# Patient Record
Sex: MALE | Race: WHITE | Employment: OTHER | URBAN - METROPOLITAN AREA
[De-identification: names, ages, dates, MRNs, and addresses within clinical notes are randomized per-mention and may not be internally consistent; named-entity substitution may affect disease eponyms.]

---

## 2017-01-27 ENCOUNTER — OFFICE VISIT (OUTPATIENT)
Dept: OPHTHALMOLOGY | Facility: CLINIC | Age: 62
End: 2017-01-27
Attending: OPHTHALMOLOGY
Payer: COMMERCIAL

## 2017-01-27 DIAGNOSIS — H35.371 ERM OD (EPIRETINAL MEMBRANE, RIGHT EYE): ICD-10-CM

## 2017-01-27 DIAGNOSIS — Z86.69 HISTORY OF RETINAL DETACHMENT: ICD-10-CM

## 2017-01-27 PROCEDURE — 99212 OFFICE O/P EST SF 10 MIN: CPT | Mod: 25,ZF

## 2017-01-27 PROCEDURE — 92134 CPTRZ OPH DX IMG PST SGM RTA: CPT | Mod: ZF | Performed by: OPHTHALMOLOGY

## 2017-01-27 RX ORDER — PREDNISOLONE ACETATE 10 MG/ML
1 SUSPENSION/ DROPS OPHTHALMIC 3 TIMES DAILY
Qty: 1 BOTTLE | Refills: 0 | Status: SHIPPED | OUTPATIENT
Start: 2017-01-27 | End: 2017-01-27

## 2017-01-27 RX ORDER — PREDNISOLONE ACETATE 10 MG/ML
1 SUSPENSION/ DROPS OPHTHALMIC 2 TIMES DAILY
Qty: 1 BOTTLE | Refills: 0 | Status: SHIPPED | OUTPATIENT
Start: 2017-01-27 | End: 2017-02-22

## 2017-01-27 ASSESSMENT — VISUAL ACUITY
METHOD: SNELLEN - LINEAR
OD_CC: 20/400
OS_PH_CC: 20/250
CORRECTION_TYPE: GLASSES
OS_CC: 20/400

## 2017-01-27 ASSESSMENT — TONOMETRY
OS_IOP_MMHG: 15
IOP_METHOD: TONOPEN
OD_IOP_MMHG: 22

## 2017-01-27 ASSESSMENT — REFRACTION_WEARINGRX
OS_SPHERE: +0.25
OS_AXIS: 175
OD_SPHERE: PLANO
OS_CYLINDER: +1.25

## 2017-01-27 ASSESSMENT — CUP TO DISC RATIO
OD_RATIO: 0.2
OS_RATIO: 0.1

## 2017-01-27 ASSESSMENT — SLIT LAMP EXAM - LIDS
COMMENTS: NORMAL
COMMENTS: NORMAL

## 2017-01-27 ASSESSMENT — EXTERNAL EXAM - RIGHT EYE: OD_EXAM: NORMAL

## 2017-01-27 ASSESSMENT — EXTERNAL EXAM - LEFT EYE: OS_EXAM: NORMAL

## 2017-01-27 NOTE — MR AVS SNAPSHOT
After Visit Summary   1/27/2017    Lex Oates    MRN: [id number]           Patient Information     Date Of Birth          1955        Visit Information        Provider Department      1/27/2017 8:30 AM Yolette Perez MD Eye Clinic        Today's Diagnoses     ERM OD (epiretinal membrane, right eye)         History of retinal detachment            Follow-ups after your visit        Follow-up notes from your care team     Return in about 2 months (around 3/27/2017) for OCT OU.      Your next 10 appointments already scheduled     Mar 27, 2017  9:00 AM   RETURN RETINA with Yolette Perez MD   Eye Clinic ([facility])    [address]  [address] [place] [id number] [phone]            Jun 27, 2017  9:00 AM   RETURN CORNEA with Sj Petersen MD   Eye Clinic ([facility])    [address]  [address] [facility]  [address] Clin 9a  [facility] [address]   [phone]              Future tests that were ordered for you today     Open Future Orders        Priority Expected Expires Ordered    OCT Retina Spectralis OU (both eyes) Routine  7/31/2018 1/27/2017            Who to contact     Please call your clinic at [phone] to:    Ask questions about your health    Make or cancel appointments    Discuss your medicines    Learn about your test results    Speak to your doctor   If you have compliments or concerns about an experience at your clinic, or if you wish to file a complaint, please contact [facility] Patient Relations at [phone] or email us at [email or web address]         Additional Information About Your Visit        MyChart Information     Sliced Apples is an electronic gateway that provides easy, online access to your medical records. With Sliced Apples, you can request a clinic appointment, read your test results, renew a prescription or communicate with your care team.      To sign up for RV ID visit the website at [email or web address]   You will be asked to enter the access code listed below, as well as some personal information. Please follow the directions to create your username and password.     Your access code is: [email or web address]  Expires: 2017  9:00 AM     Your access code will  in 90 days. If you need help or a new code, please contact your [facility] or call [phone] for assistance.        Care EveryWhere ID     This is your Care EveryWhere ID. This could be used by other organizations to access your [place] medical records  [id number]         Blood Pressure from Last 3 Encounters:   10/30/15 137/72   01/26/15 152/78   14 164/82    Weight from Last 3 Encounters:   10/30/15 103.7 kg (228 lb 9.9 oz)   01/22/15 111.676 kg (246 lb 3.2 oz)   14 114 kg (251 lb 5.2 oz)              We Performed the Following     OCT Retina Spectralis OU (both eyes)          Today's Medication Changes          These changes are accurate as of: 17  9:44 AM.  If you have any questions, ask your nurse or doctor.               These medicines have changed or have updated prescriptions.        Dose/Directions    * PRED FORTE 1 % ophthalmic susp   This may have changed:  Another medication with the same name was added. Make sure you understand how and when to take each.   Used for:  Post-operative state   Generic drug:  prednisoLONE acetate   Changed by:  Sj Petersen MD        Dose:  1 drop   Place 1 drop into the right eye 2 times daily   Quantity:  1 Bottle   Refills:  11       * prednisoLONE acetate 1 % ophthalmic susp   Commonly known as:  PRED FORTE   This may have changed:  You were already taking a medication with the same name, and this prescription was added. Make sure you understand how and when to take each.   Used for:  ERM OD (epiretinal membrane, right eye), History of retinal detachment   Changed by:   Yolette Perez MD        Dose:  1 drop   Place 1 drop into the right eye 2 times daily   Quantity:  1 Bottle   Refills:  0       * Notice:  This list has 2 medication(s) that are the same as other medications prescribed for you. Read the directions carefully, and ask your doctor or other care provider to review them with you.         Where to get your medicines      These medications were sent to [facility]/pharmacy #[id number] - [place] - [address]  [address] [address]     Phone:  [phone]    - prednisoLONE acetate 1 % ophthalmic susp             Primary Care Provider Office Phone # Fax #    Torin Robles [phone]       [facility] [address] [place] [address]        Thank you!     Thank you for choosing EYE CLINIC  for your care. Our goal is always to provide you with excellent care. Hearing back from our patients is one way we can continue to improve our services. Please take a few minutes to complete the written survey that you may receive in the mail after your visit with us. Thank you!             Your Updated Medication List - Protect others around you: Learn how to safely use, store and throw away your medicines at www.disposemymeds.org.          This list is accurate as of: 1/27/17  9:44 AM.  Always use your most recent med list.                   Brand Name Dispense Instructions for use    ASPIRIN PO      Take 81 mg by mouth daily       ATIVAN PO      Take 0.5 mg by mouth 3 times daily as needed for anxiety       glyBURIDE 1.25 MG tablet    DIABETA /MICRONASE     Take 2 tablets by mouth 2 times daily       HYDROcodone-acetaminophen 5-325 MG per tablet    NORCO    20 tablet    Take 1 tablet by mouth every 6 hours as needed for pain Maximum of 4000 mg of acetaminophen in 24 hours.       LANTUS VIAL 100 UNIT/ML injection   Generic drug:  insulin glargine      Inject 70 Units Subcutaneous At Bedtime       latanoprost 0.005 % ophthalmic solution     XALATAN    3 Bottle    Place 1 drop Into the left eye At Bedtime       LIPITOR 10 MG tablet   Generic drug:  atorvastatin      Take 10 mg by mouth daily       LOSARTAN POTASSIUM PO      Take 100 mg by mouth daily       NORVASC 2.5 MG tablet   Generic drug:  amLODIPine      Take 1 tablet by mouth daily       * ofloxacin 0.3 % ophthalmic solution    OCUFLOX    1 Bottle    Place 1 drop into the right eye 4 times daily       * ofloxacin 0.3 % ophthalmic solution    OCUFLOX    1 Bottle    Place 1 drop into the right eye 4 times daily       * PRED FORTE 1 % ophthalmic susp   Generic drug:  prednisoLONE acetate     1 Bottle    Place 1 drop into the right eye 2 times daily       * prednisoLONE acetate 1 % ophthalmic susp    PRED FORTE    1 Bottle    Place 1 drop into the right eye 2 times daily       PRILOSEC OTC PO      Take 20 mg by mouth daily       REFRESH 1.4-0.6 % ophthalmic solution   Generic drug:  polyvinyl alcohol-povidone      Place 1-2 drops into the right eye as needed       triamcinolone 0.1 % cream    KENALOG     2 times daily       * Notice:  This list has 4 medication(s) that are the same as other medications prescribed for you. Read the directions carefully, and ask your doctor or other care provider to review them with you.

## 2017-01-27 NOTE — PROGRESS NOTES
CC - Post Op OD    HPI:  VA OD improving since SHANIQUA  60 yo male POM #1 s/p PPV/MP/AC washout/FAX OD for ERM 12/22/16.      Mr. Oates is a 61  year old male s/p silicone oil removal right eye 1/25/2015, prior  PPV/SO replacement for RRD (5/1/14).  Retinal detachment right eye s/p repair x 4, most recent Pars plana vitrectomy (PPV)/SO 5/1/14    Ocular surgery:  PPV/MS/FAx/AC washout OD 12/22/16 for SO in AC and severe ERM  pentrating keratoplasty (PKP) right eye with intraocular lens exchange 6/17/15  Pars plana vitrectomy (PPV)/SO removal /FAx 1/25/2015 right eye   Pars plana vitrectomy (PPV)/SO placement 5/1/2014 right eye   Pars plana vitrectomy (PPV)/SO removal/FGx C3F8 2/25/14 (DDK) right eye   PPV/SO 9/19/13 (DDK) right eye   S/p 7/15, 8/16 Pars plana vitrectomy (PPV) and SBP with FGx (Max Alcon) OD  S/p pentrating keratoplasty (PKP) 3/26/13 (noé)  S/p laser in situ keratomileusis (LASIK) both eyes 1994  S/p Radial keratotomy (RK) both eyes 1994  S/p Cataract extraction intraocular lens both eyes 2003, 2004  Sp Retinal detachment  Repair left eye 1994    Ocular meds:  Latanoprost 1 drop nightly left eye.  PFATs as needed     PF OD 4/day  PT OD 4/day    RETINAL IMAGING   OCT  11-15-16  Right eye- Moderate/severe cystoid macular edema/distortion, stable; 747->723  Left eye- 2-3+ cystoid macular edema, retina flat. Poor quality image. Appears stable; 706->716.    ASSESSMENT & PLAN  1.  POM#1 s/p PPV/MP/AC washout/FAX OD   - IOP OK (mild elevation), retina flat      - PF 3/day x 1 week then 2/day baseline d/t PK   - stop PT   - add xalatan OD    - RTC 2 months      2.  H/o RD OD (multiple)   - S/p Pars plana vitrectomy (PPV)/SO removal/FAx/STS  right eye 1/25/15   - S/P prior Pars plana vitrectomy (PPV)/SO placement 5/1/14 right eye   - Retina flat. Breaks nasal, superior, and inferotemporal with pexy   - Intraocular pressure OK, retina flat    3.  cystoid macular edema left eye   - letha chronic   - unclear  benefit from Tx   - could try trial of IVTA in future    4.  pentrating keratoplasty (PKP) right eye.    - s/p pentrating keratoplasty (PKP)/IOL exchange 6/2015   - follows with Page, doing well     5.   s/p Retinal detachment repair left eye, 1994   -no breaks/tears   -observe    6.  ocular hypertension both eyes   - Pressures stable. Continue Latanoprost  1 drop nightly OS   - add to OD d/t IOP elevation   - Continue to monitor.     7.  Pseudophakia - both eyes    In good position.    8.  DM no DR   - has had extensive laser for RD OU      RTC 2 months, OCT OU    ATTESTATION     Attending Physician Attestation:     Complete documentation of historical and exam elements from today's encounter can be found in the full encounter summary report (not redupilcated in this progress note).  I personally obtained the chief complaint(s) and hisotry of present illness., I have confirmed and edited as necessary the CC, HPI, PMH/PSH, Social history, FMH,  ROS, and exam/neuro findings as obtained by the technician or others. I have examined this patient myself.  and I personally viewed the image(s) and studies listed above and the documentation reflects my findings and interpretation.    Yolette Perez MD, PhD  , Vitreoretinal Surgery  Department of Ophthalmology  [facility]

## 2017-01-27 NOTE — NURSING NOTE
Chief Complaints and History of Present Illnesses   Patient presents with     Follow Up For     1 month follow up s/p PPV/MP/AC washout/FAX OD for ERM     HPI    Affected eye(s):  Both   Symptoms:     No floaters   No redness   No Dryness         Do you have eye pain now?:  No      Comments:  Pt states vision has improved since last visit. Pt fell down on 12/26/2016 and was seeing flashes in RE. Pt still seeing flashes in RE when he lays down for bed.  DMII BS: 146 this morning.  A1C: 7.2 taken 12/2016 per pt  No results found for this basename: a1c    [facility] January 27, 2017 8:33 AM

## 2017-02-22 DIAGNOSIS — H35.371 ERM OD (EPIRETINAL MEMBRANE, RIGHT EYE): ICD-10-CM

## 2017-02-22 DIAGNOSIS — Z86.69 HISTORY OF RETINAL DETACHMENT: ICD-10-CM

## 2017-02-22 RX ORDER — PREDNISOLONE ACETATE 10 MG/ML
1 SUSPENSION/ DROPS OPHTHALMIC 2 TIMES DAILY
Qty: 5 ML | Refills: 1 | Status: SHIPPED | OUTPATIENT
Start: 2017-02-22 | End: 2017-07-11

## 2017-02-22 NOTE — TELEPHONE ENCOUNTER
Prednisolone AC 1%  Last Written Prescription Date:  1/27/17  Last Fill Quantity: 1,   # refills: 0  Last Office Visit: 1/27/17  Future Office visit:   3/27/17  Last Note:Progress Notes  Encounter Date: 1/27/2017  Yolette Perez MD   Ophthalmology      CC - Post Op OD     HPI:  VA OD improving since SHANIQUA  62 yo male POM #1 s/p PPV/MP/AC washout/FAX OD for ERM 12/22/16.      Mr. Oates is a 61 year old male s/p silicone oil removal right eye 1/25/2015, prior PPV/SO replacement for RRD (5/1/14).  Retinal detachment right eye s/p repair x 4, most recent Pars plana vitrectomy (PPV)/SO 5/1/14     Ocular surgery:  PPV/MS/FAx/AC washout OD 12/22/16 for SO in AC and severe ERM  pentrating keratoplasty (PKP) right eye with intraocular lens exchange 6/17/15  Pars plana vitrectomy (PPV)/SO removal /FAx 1/25/2015 right eye   Pars plana vitrectomy (PPV)/SO placement 5/1/2014 right eye   Pars plana vitrectomy (PPV)/SO removal/FGx C3F8 2/25/14 (DDK) right eye   PPV/SO 9/19/13 (DDK) right eye   S/p 7/15, 8/16 Pars plana vitrectomy (PPV) and SBP with FGx (Max Alcon) OD  S/p pentrating keratoplasty (PKP) 3/26/13 (noé)  S/p laser in situ keratomileusis (LASIK) both eyes 1994  S/p Radial keratotomy (RK) both eyes 1994  S/p Cataract extraction intraocular lens both eyes 2003, 2004  Sp Retinal detachment Repair left eye 1994     Ocular meds:  Latanoprost 1 drop nightly left eye.  PFATs as needed      PF OD 4/day  PT OD 4/day     RETINAL IMAGING   OCT 11-15-16  Right eye- Moderate/severe cystoid macular edema/distortion, stable; 747->723  Left eye- 2-3+ cystoid macular edema, retina flat. Poor quality image. Appears stable; 706->716.     ASSESSMENT & PLAN  1. POM#1 s/p PPV/MP/AC washout/FAX OD  - IOP OK (mild elevation), retina flat      - PF 3/day x 1 week then 2/day baseline d/t PK  - stop PT  - add xalatan OD   - RTC 2 months        2. H/o RD OD (multiple)  - S/p Pars plana vitrectomy (PPV)/SO removal/FAx/STS right eye  1/25/15  - S/P prior Pars plana vitrectomy (PPV)/SO placement 5/1/14 right eye  - Retina flat. Breaks nasal, superior, and inferotemporal with pexy  - Intraocular pressure OK, retina flat     3. cystoid macular edema left eye  - likley chronic  - unclear benefit from Tx  - could try trial of IVTA in future     4. pentrating keratoplasty (PKP) right eye.   - s/p pentrating keratoplasty (PKP)/IOL exchange 6/2015  - follows with Page, doing well      5. s/p Retinal detachment repair left eye, 1994  -no breaks/tears  -observe     6. ocular hypertension both eyes  - Pressures stable. Continue Latanoprost 1 drop nightly OS  - add to OD d/t IOP elevation  - Continue to monitor.      7. Pseudophakia - both eyes   In good position.     8. DM no DR  - has had extensive laser for RD OU        RTC 2 months, OCT OU     ATTESTATION      Attending Physician Attestation:      Complete documentation of historical and exam elements from today's encounter can be found in the full encounter summary report (not redupilcated in this progress note). I personally obtained the chief complaint(s) and hisotry of present illness., I have confirmed and edited as necessary the CC, HPI, PMH/PSH, Social history, FMH, ROS, and exam/neuro findings as obtained by the technician or others. I have examined this patient myself. and I personally viewed the image(s) and studies listed above and the documentation reflects my findings and interpretation.     Yolette Perez MD, PhD  , Vitreoretinal Surgery  Department of Ophthalmology  [facility]               Routing refill request to provider for review/approval because:  On med list twice with 2 different eye providers. Unsure if med is to be continued and who is ordering

## 2017-03-27 ENCOUNTER — OFFICE VISIT (OUTPATIENT)
Dept: OPHTHALMOLOGY | Facility: CLINIC | Age: 62
End: 2017-03-27
Attending: OPHTHALMOLOGY
Payer: COMMERCIAL

## 2017-03-27 DIAGNOSIS — H35.353 CYSTOID MACULAR DEGENERATION OF RETINA, BILATERAL: ICD-10-CM

## 2017-03-27 PROCEDURE — 99213 OFFICE O/P EST LOW 20 MIN: CPT | Mod: ZF

## 2017-03-27 PROCEDURE — 92134 CPTRZ OPH DX IMG PST SGM RTA: CPT | Mod: ZF | Performed by: OPHTHALMOLOGY

## 2017-03-27 ASSESSMENT — TONOMETRY
OS_IOP_MMHG: 14
OD_IOP_MMHG: 18
IOP_METHOD: TONOPEN

## 2017-03-27 ASSESSMENT — VISUAL ACUITY
CORRECTION_TYPE: GLASSES
OD_CC: 20/400
OS_CC: 20/150
METHOD: SNELLEN - LINEAR

## 2017-03-27 ASSESSMENT — EXTERNAL EXAM - LEFT EYE: OS_EXAM: NORMAL

## 2017-03-27 ASSESSMENT — CUP TO DISC RATIO
OD_RATIO: 0.2
OS_RATIO: 0.1

## 2017-03-27 ASSESSMENT — SLIT LAMP EXAM - LIDS
COMMENTS: NORMAL
COMMENTS: NORMAL

## 2017-03-27 ASSESSMENT — EXTERNAL EXAM - RIGHT EYE: OD_EXAM: NORMAL

## 2017-03-27 NOTE — MR AVS SNAPSHOT
After Visit Summary   3/27/2017    Lex Oates    MRN: [id number]           Patient Information     Date Of Birth          1955        Visit Information        Provider Department      3/27/2017 9:00 AM Yloette Perez MD Eye Clinic        Today's Diagnoses     Cystoid macular degeneration of retina, bilateral           Follow-ups after your visit        Follow-up notes from your care team     Return in 3 months (on 6/27/2017) for OCT OU.      Your next 10 appointments already scheduled     Jun 27, 2017  9:00 AM CDT   RETURN CORNEA with Sj Petersen MD   Eye Clinic ([facility])    [address]  [address]   [address] Clin 9a  [facility] [address]   [phone]            Jul 11, 2017  8:30 AM CDT   RETURN RETINA with Yolette Perez MD   Eye Clinic ([facility])    [address]  [address]   [address] 9a  [facility] [address]   [phone]              Future tests that were ordered for you today     Open Future Orders        Priority Expected Expires Ordered    OCT Retina Spectralis OU (both eyes) Routine  9/28/2018 3/27/2017            Who to contact     Please call your clinic at [phone] to:    Ask questions about your health    Make or cancel appointments    Discuss your medicines    Learn about your test results    Speak to your doctor   If you have compliments or concerns about an experience at your clinic, or if you wish to file a complaint, please contact [facility] Patient Relations at [phone] or email us at [email or web address]         Additional Information About Your Visit        Adapt Technologieshart Information     Presto Services is an electronic gateway that provides easy, online access to your medical records. With Presto Services, you can request a clinic appointment, read your test results, renew a prescription or communicate with your care team.     To sign up for Adapt Technologieshart visit  the website at [email or web address]   You will be asked to enter the access code listed below, as well as some personal information. Please follow the directions to create your username and password.     Your access code is: [email or web address]  Expires: 2017  8:30 AM     Your access code will  in 90 days. If you need help or a new code, please contact your [facility] or call [phone] for assistance.        Care EveryWhere ID     This is your Care EveryWhere ID. This could be used by other organizations to access your [place] medical records  [id number]         Blood Pressure from Last 3 Encounters:   10/30/15 137/72   01/26/15 152/78   14 164/82    Weight from Last 3 Encounters:   10/30/15 103.7 kg (228 lb 9.9 oz)   01/22/15 111.7 kg (246 lb 3.2 oz)   14 114 kg (251 lb 5.2 oz)              We Performed the Following     OCT Retina Spectralis OU (both eyes)        Primary Care Provider Office Phone # Fax #    Torin Robles [phone]       [facility] [address] [place] [address]        Thank you!     Thank you for choosing EYE CLINIC  for your care. Our goal is always to provide you with excellent care. Hearing back from our patients is one way we can continue to improve our services. Please take a few minutes to complete the written survey that you may receive in the mail after your visit with us. Thank you!             Your Updated Medication List - Protect others around you: Learn how to safely use, store and throw away your medicines at www.disposemymeds.org.          This list is accurate as of: 3/27/17 11:14 AM.  Always use your most recent med list.                   Brand Name Dispense Instructions for use    ASPIRIN PO      Take 81 mg by mouth daily       ATIVAN PO      Take 0.5 mg by mouth 3 times daily as needed for anxiety       glyBURIDE 1.25 MG tablet    DIABETA /MICRONASE     Take 2 tablets by mouth 2 times daily        HYDROcodone-acetaminophen 5-325 MG per tablet    NORCO    20 tablet    Take 1 tablet by mouth every 6 hours as needed for pain Maximum of 4000 mg of acetaminophen in 24 hours.       LANTUS VIAL 100 UNIT/ML injection   Generic drug:  insulin glargine      Inject 70 Units Subcutaneous At Bedtime       latanoprost 0.005 % ophthalmic solution    XALATAN    3 Bottle    Place 1 drop Into the left eye At Bedtime       LIPITOR 10 MG tablet   Generic drug:  atorvastatin      Take 10 mg by mouth daily       LOSARTAN POTASSIUM PO      Take 100 mg by mouth daily       NORVASC 2.5 MG tablet   Generic drug:  amLODIPine      Take 1 tablet by mouth daily       * ofloxacin 0.3 % ophthalmic solution    OCUFLOX    1 Bottle    Place 1 drop into the right eye 4 times daily       * ofloxacin 0.3 % ophthalmic solution    OCUFLOX    1 Bottle    Place 1 drop into the right eye 4 times daily       * PRED FORTE 1 % ophthalmic susp   Generic drug:  prednisoLONE acetate     1 Bottle    Place 1 drop into the right eye 2 times daily       * prednisoLONE acetate 1 % ophthalmic susp    PRED FORTE    5 mL    Place 1 drop into the right eye 2 times daily       PRILOSEC OTC PO      Take 20 mg by mouth daily       REFRESH 1.4-0.6 % ophthalmic solution   Generic drug:  polyvinyl alcohol-povidone      Place 1-2 drops into the right eye as needed       triamcinolone 0.1 % cream    KENALOG     2 times daily       * Notice:  This list has 4 medication(s) that are the same as other medications prescribed for you. Read the directions carefully, and ask your doctor or other care provider to review them with you.

## 2017-03-27 NOTE — NURSING NOTE
Chief Complaints and History of Present Illnesses   Patient presents with     Follow Up For     s/p PPV/MP/AC washout/FAX OD     HPI    Affected eye(s):  Right   Symptoms:     No blurred vision   No decreased vision   Dryness (Comment: from windy weather)         Do you have eye pain now?:  No      Comments:  No changes to report per pt.  Mirela Merino COA 9:18 AM March 27, 2017

## 2017-03-27 NOTE — PROGRESS NOTES
CC - Post Op OD    INTERVAL HISTORY -  VA OD improving since SHANIQUA    HPI:    60 yo male POM #3 s/p PPV/MP/AC washout/FAX OD for ERM 12/22/16   Mr. Oates is a 61  year old male s/p silicone oil removal right eye 1/25/2015, prior  PPV/SO replacement for RRD (5/1/14).  Retinal detachment right eye s/p repair x 4, most recent Pars plana vitrectomy (PPV)/SO 5/1/14    Ocular surgery:  PPV/MS/FAx/AC washout OD 12/22/16 for SO in AC and severe ERM  pentrating keratoplasty (PKP) right eye with intraocular lens exchange 6/17/15  Pars plana vitrectomy (PPV)/SO removal /FAx 1/25/2015 right eye   Pars plana vitrectomy (PPV)/SO placement 5/1/2014 right eye   Pars plana vitrectomy (PPV)/SO removal/FGx C3F8 2/25/14 (DDK) right eye   PPV/SO 9/19/13 (DDK) right eye   S/p 7/15, 8/16 Pars plana vitrectomy (PPV) and SBP with FGx (Max Alcon) OD  S/p pentrating keratoplasty (PKP) 3/26/13 (noé)  S/p laser in situ keratomileusis (LASIK) both eyes 1994  S/p Radial keratotomy (RK) both eyes 1994  S/p Cataract extraction intraocular lens both eyes 2003, 2004  Sp Retinal detachment  Repair left eye 1994    Ocular meds:  Latanoprost 1 drop nightly both eyes  PFATs as needed     PF OD 2/day    RETINAL IMAGING   OCT  3-27-17  Right eye- minimal edema, stable, minimal distortion  Left eye- 2-3+ cystoid macular edema, retina flat. Poor quality image. Appears stable    ASSESSMENT & PLAN  1.  POM#3  OS   - s/p PPV/MP/AC washout/FAX OD 12/22/16   - IOP good, retina flat      - Continue PF 2x daily (for PKP)   - continue xalatan OD    - RTC 3 months     2.  H/o RD OD (multiple)   - S/p Pars plana vitrectomy (PPV)/SO removal/FAx/STS  right eye 1/25/15   - S/P prior Pars plana vitrectomy (PPV)/SO placement 5/1/14 right eye   - Retina flat. Breaks nasal, superior, and inferotemporal with pexy   - Intraocular pressure OK, retina flat    3.  Cystoid macular edema left eye   - likley chronic - has improved some since last visit   - trial of PF 2x  daily   - re-check in 2 months with OCT    4.  pentrating keratoplasty (PKP) right eye.    - s/p pentrating keratoplasty (PKP)/IOL exchange 6/2015   - follows with [facility], doing well     5.   s/p Retinal detachment repair left eye, 1994   -no breaks/tears   -observe    6.  ocular hypertension both eyes   - Pressures stable. Continue Latanoprost  1 drop nightly OS   - add to OD d/t IOP elevation   - Continue to monitor.     7.  Pseudophakia - both eyes    In good position.    8.  DM no DR   - has had extensive laser for RD OU    RTC 2-3 months, OCT OU    Brenden Wadsworth MD  Retina Fellow    ATTESTATION     Attending Physician Attestation:     Complete documentation of historical and exam elements from today's encounter can be found in the full encounter summary report (not redupilcated in this progress note).  I personally obtained the chief complaint(s) and hisotry of present illness., I have confirmed and edited as necessary the Past medical history/Past Surgical History, Social history, Family medical history,  Review of systems, and exam/neuro findings as obtained by the technician or others. I have examined this patient myself. , I personally viewed the relevant tests, image(s), reports, and studies listed above and the documentation reflects my findings and interpretation. and I formulated and edited as necessary the assessment and plan and discussed the findings and management plan with the patient and family.    Yolette Perez MD, PhD  , Vitreoretinal Surgery  Department of Ophthalmology  [facility]

## 2017-06-27 ENCOUNTER — OFFICE VISIT (OUTPATIENT)
Dept: OPHTHALMOLOGY | Facility: CLINIC | Age: 62
End: 2017-06-27
Attending: OPHTHALMOLOGY
Payer: COMMERCIAL

## 2017-06-27 DIAGNOSIS — Z96.1 PSEUDOPHAKIA OF BOTH EYES: Primary | ICD-10-CM

## 2017-06-27 DIAGNOSIS — Z94.7 HISTORY OF PENETRATING KERATOPLASTY: ICD-10-CM

## 2017-06-27 DIAGNOSIS — H43.312 VITREOUS STRANDS OF LEFT EYE: ICD-10-CM

## 2017-06-27 PROCEDURE — 99212 OFFICE O/P EST SF 10 MIN: CPT | Mod: ZF

## 2017-06-27 PROCEDURE — 67031 LASER SURGERY EYE STRANDS: CPT | Mod: ZF | Performed by: OPHTHALMOLOGY

## 2017-06-27 RX ORDER — MINERAL OIL, PETROLATUM 425; 573 MG/G; MG/G
1 OINTMENT OPHTHALMIC AT BEDTIME
COMMUNITY
End: 2018-09-25

## 2017-06-27 ASSESSMENT — SLIT LAMP EXAM - LIDS: COMMENTS: NORMAL

## 2017-06-27 ASSESSMENT — VISUAL ACUITY
OS_CC: 20/250
METHOD: SNELLEN - LINEAR
OD_CC: 20/250
CORRECTION_TYPE: GLASSES
OS_PH_CC: 20/125

## 2017-06-27 ASSESSMENT — REFRACTION_WEARINGRX
OS_CYLINDER: +1.25
OS_AXIS: 175
OS_SPHERE: +0.25
OD_SPHERE: PLANO

## 2017-06-27 ASSESSMENT — CONF VISUAL FIELD
OS_INFERIOR_NASAL_RESTRICTION: 3
OS_INFERIOR_TEMPORAL_RESTRICTION: 3
OS_SUPERIOR_NASAL_RESTRICTION: 3
OS_SUPERIOR_TEMPORAL_RESTRICTION: 3
OD_SUPERIOR_TEMPORAL_RESTRICTION: 3

## 2017-06-27 ASSESSMENT — TONOMETRY
OS_IOP_MMHG: 13
OD_IOP_MMHG: 12
IOP_METHOD: ICARE

## 2017-06-27 ASSESSMENT — EXTERNAL EXAM - RIGHT EYE: OD_EXAM: NORMAL

## 2017-06-27 ASSESSMENT — EXTERNAL EXAM - LEFT EYE: OS_EXAM: NORMAL

## 2017-06-27 NOTE — PROGRESS NOTES
CC - recheck for K transplant    INTERVAL HISTORY -  VA OD improving since SHANIQUA    HPI:    60 yo male POM #3 s/p PPV/MP/AC washout/FAX OD for ERM 12/22/16   Mr. Oates is a 61  year old male s/p silicone oil removal right eye 1/25/2015, prior  PPV/SO replacement for RRD (5/1/14).  Retinal detachment right eye s/p repair x 4, most recent Pars plana vitrectomy (PPV)/SO 5/1/14    Ocular surgery:  PPV/MS/FAx/AC washout OD 12/22/16 for SO in AC and severe ERM  pentrating keratoplasty (PKP) right eye with intraocular lens exchange 6/17/15  Pars plana vitrectomy (PPV)/SO removal /FAx 1/25/2015 right eye   Pars plana vitrectomy (PPV)/SO placement 5/1/2014 right eye   Pars plana vitrectomy (PPV)/SO removal/FGx C3F8 2/25/14 (DDK) right eye   PPV/SO 9/19/13 (DDK) right eye   S/p 7/15, 8/16 Pars plana vitrectomy (PPV) and SBP with FGx (Devicescape Alcon) OD  S/p pentrating keratoplasty (PKP) 3/26/13 (noé)  S/p laser in situ keratomileusis (LASIK) both eyes 1994  S/p Radial keratotomy (RK) both eyes 1994  S/p Cataract extraction intraocular lens both eyes 2003, 2004  Sp Retinal detachment  Repair left eye 1994    Ocular meds:  Latanoprost 1 drop nightly both eyes  PFATs as needed     PF OD 2/day    RETINAL IMAGING   OCT  3-27-17  Right eye- minimal edema, stable, minimal distortion  Left eye- 2-3+ cystoid macular edema, retina flat. Poor quality image. Appears stable    ASSESSMENT & PLAN  1.  POM#3  OD   - s/p PPV/MP/AC washout/FAX OD 12/22/16   - to see Kris in 2 weeks     2.  H/o RD OD (multiple)   - S/p Pars plana vitrectomy (PPV)/SO removal/FAx/STS  right eye 1/25/15   - S/P prior Pars plana vitrectomy (PPV)/SO placement 5/1/14 right eye   - Retina flat. Breaks nasal, superior, and inferotemporal with pexy   - Intraocular pressure OK, retina flat    3.  Cystoid macular edema left eye   - likeley chronic - has improved some since last visit   - vitreous peaking at pupil noted today    4.  pentrating keratoplasty (PKP) right  eye.    - s/p pentrating keratoplasty (PKP)/IOL exchange 6/2015   - follows with [facility], doing well     5.   s/p Retinal detachment repair left eye, 1994   -no breaks/tears   -observe    6.  ocular hypertension both eyes   - Pressures stable. Continue Latanoprost  1 drop nightly OS   - add to OD d/t IOP elevation   - Continue to monitor.     7.  Pseudophakia - both eyes    In good position.    8.  DM no DR   - has had extensive laser for RD OU        Small vitreous fiber noted left eye causing pupil peaking to old phacoemulsification wound -- not noted previously, may be a reason for his chronic cystoid macular edema left eye    Reasonable to do YAG vitreolysis today  Increase Predforte  To four times a day     Ok to try Rigid gas permeable lens in both eyes if patient interested -- Dr. NETO España ([place])      ~~~~~~~~~~~~~~~~~~~~~~~~~~~~~~~~~~~~~~~~~~~~~~~~~~~~~~~~~~~~~~~~      Complete documentation of historical and exam elements from today's encounter can be found in the full encounter summary report (not reduplicated in this progress note). I personally obtained the chief complaint(s) and history of present illness.  I confirmed and edited as necessary the review of systems, past medical/surgical history, family history, social history, and examination findings as documented by others.  I examined the patient myself, and I personally reviewed the relevant tests, images, and reports as documented above. I formulated and edited as necessary the assessment and plan and discussed the findings and management plan with the patient and family.     Sj Petersen MD, MA  Director, Cornea & Anterior Segment  [facility] Department of Ophthalmology & Visual Neuroscience

## 2017-06-27 NOTE — LETTER
6/27/2017       RE: Lex Oates  [address]  [place] [facility] [address]     Dear Colleague,    Thank you for referring your patient, Lex Oates, to the EYE CLINIC at [facility]. I am writing to give you an update on his status.  Please see a copy of my visit note below.    CC - recheck for K transplant    INTERVAL HISTORY -  VA OD improving since SHANIQUA    HPI:    60 yo male POM #3 s/p PPV/MP/AC washout/FAX OD for ERM 12/22/16   Mr. Oates is a 61  year old male s/p silicone oil removal right eye 1/25/2015, prior  PPV/SO replacement for RRD (5/1/14).  Retinal detachment right eye s/p repair x 4, most recent Pars plana vitrectomy (PPV)/SO 5/1/14    Ocular surgery:  PPV/MS/FAx/AC washout OD 12/22/16 for SO in AC and severe ERM  pentrating keratoplasty (PKP) right eye with intraocular lens exchange 6/17/15  Pars plana vitrectomy (PPV)/SO removal /FAx 1/25/2015 right eye   Pars plana vitrectomy (PPV)/SO placement 5/1/2014 right eye   Pars plana vitrectomy (PPV)/SO removal/FGx C3F8 2/25/14 (DDK) right eye   PPV/SO 9/19/13 (DDK) right eye   S/p 7/15, 8/16 Pars plana vitrectomy (PPV) and SBP with FGx (Max Alcon) OD  S/p pentrating keratoplasty (PKP) 3/26/13 (noé)  S/p laser in situ keratomileusis (LASIK) both eyes 1994  S/p Radial keratotomy (RK) both eyes 1994  S/p Cataract extraction intraocular lens both eyes 2003, 2004  Sp Retinal detachment  Repair left eye 1994    Ocular meds:  Latanoprost 1 drop nightly both eyes  PFATs as needed     PF OD 2/day    RETINAL IMAGING   OCT  3-27-17  Right eye- minimal edema, stable, minimal distortion  Left eye- 2-3+ cystoid macular edema, retina flat. Poor quality image. Appears stable    ASSESSMENT & PLAN  1.  POM#3  OD   - s/p PPV/MP/AC washout/FAX OD 12/22/16   - to see Kooze in 2 weeks     2.  H/o RD OD (multiple)   - S/p Pars plana vitrectomy (PPV)/SO removal/FAx/STS  right eye 1/25/15   - S/P prior Pars plana vitrectomy (PPV)/SO  placement 5/1/14 right eye   - Retina flat. Breaks nasal, superior, and inferotemporal with pexy   - Intraocular pressure OK, retina flat    3.  Cystoid macular edema left eye   - likeley chronic - has improved some since last visit   - vitreous peaking at pupil noted today    4.  pentrating keratoplasty (PKP) right eye.    - s/p pentrating keratoplasty (PKP)/IOL exchange 6/2015   - follows with [facility], doing well     5.   s/p Retinal detachment repair left eye, 1994   -no breaks/tears   -observe    6.  ocular hypertension both eyes   - Pressures stable. Continue Latanoprost  1 drop nightly OS   - add to OD d/t IOP elevation   - Continue to monitor.     7.  Pseudophakia - both eyes    In good position.    8.  DM no DR   - has had extensive laser for RD OU    RTC 2-3 months, OCT both eyes    Small vitreous fiber noted left eye causing pupil peaking to old phacoemulsification wound -- not noted previously, may be a reason for his chronic cystoid macular edema left eye    Reasonable to do YAG vitreolysis today  Increase Predforte  To four times a day     Ok to try Rigid gas permeable lens in both eyes if patient interested -- Dr. NETO España ([place])      ~~~~~~~~~~~~~~~~~~~~~~~~~~~~~~~~~~~~~~~~~~~~~~~~~~~~~~~~~~~~~~~~        Again, thank you for allowing me to participate in the care of your patient.      Sincerely,    Sj Petersen MD, MA  Director, Cornea & Anterior Segment  Director, Fellowship in Cornea & External Disease  [facility] Department of Ophthalmology & Visual Neuroscience  : Tosha Jimenez [phone]  Email: [email or web address]  Mobile: [phone]

## 2017-06-27 NOTE — NURSING NOTE
Chief Complaints and History of Present Illnesses   Patient presents with     Follow Up For     Pentrating keratoplasty (PKP) on 6/17/15 with intraocular lens exchange and glued IOL     HPI    Affected eye(s):  Both   Symptoms:        Duration:  6 months   Frequency:  Constant       Do you have eye pain now?:  No      Comments:  Pt. States that he is doing well.  No change in VA BE.  No c/o comfort BE.  Faith Garza COT 9:30 AM June 27, 2017

## 2017-06-27 NOTE — MR AVS SNAPSHOT
After Visit Summary   2017    Lex Oates    MRN: [id number]           Patient Information     Date Of Birth          1955        Visit Information        Provider Department      2017 9:00 AM Sj Petersen MD Eye Clinic        Today's Diagnoses     Pseudophakia of both eyes    -  1    History of penetrating keratoplasty        Vitreous strands of left eye           Follow-ups after your visit        Your next 10 appointments already scheduled     2017  8:30 AM CDT   RETURN RETINA with Yolette Perez MD   Eye Clinic ([facility])    [address]  [address]  [address]  [place] [address] [phone]              Who to contact     Please call your clinic at [phone] to:    Ask questions about your health    Make or cancel appointments    Discuss your medicines    Learn about your test results    Speak to your doctor   If you have compliments or concerns about an experience at your clinic, or if you wish to file a complaint, please contact [facility] Patient Relations at [phone] or email us at [email or web address]         Additional Information About Your Visit        MyChart Information     [employer] is an electronic gateway that provides easy, online access to your medical records. With [employer], you can request a clinic appointment, read your test results, renew a prescription or communicate with your care team.     To sign up for [employer] visit the website at [email or web address]   You will be asked to enter the access code listed below, as well as some personal information. Please follow the directions to create your username and password.     Your access code is: [email or web address]  Expires: 2017  6:30 AM     Your access code will  in 90 days. If you need help or a new code, please contact your [facility] Clinic or call [phone] for  assistance.        Care EveryWhere ID     This is your Care EveryWhere ID. This could be used by other organizations to access your [place] medical records  [id number]         Blood Pressure from Last 3 Encounters:   10/30/15 137/72   01/26/15 152/78   02/25/14 164/82    Weight from Last 3 Encounters:   10/30/15 103.7 kg (228 lb 9.9 oz)   01/22/15 111.7 kg (246 lb 3.2 oz)   02/25/14 114 kg (251 lb 5.2 oz)              We Performed the Following     Laser Vitreolysis, Anterior OS (left eye)        Primary Care Provider Office Phone # Fax #    Torin Robles [phone]       [facility] [address] [place] [address]        Equal Access to Services     LEXUS ALFREDO : Chelsey sorensono Sonicole, waaxda luqadaha, qaybta kaalmada adeegyada, ki jj . So [facility] [phone].    ATENCIÓN: Si habla español, tiene a giraldo disposición servicios gratuitos de asistencia lingüística. Llame al [phone].    We comply with applicable federal civil rights laws and [place] laws. We do not discriminate on the basis of race, color, national origin, age, disability sex, sexual orientation or gender identity.            Thank you!     Thank you for choosing EYE CLINIC  for your care. Our goal is always to provide you with excellent care. Hearing back from our patients is one way we can continue to improve our services. Please take a few minutes to complete the written survey that you may receive in the mail after your visit with us. Thank you!             Your Updated Medication List - Protect others around you: Learn how to safely use, store and throw away your medicines at www.disposemymeds.org.          This list is accurate as of: 6/27/17 11:05 AM.  Always use your most recent med list.                   Brand Name Dispense Instructions for use Diagnosis    ASPIRIN PO      Take 81 mg by mouth daily        ATIVAN PO      Take 0.5 mg by mouth 3 times daily as needed for  anxiety        glyBURIDE 1.25 MG tablet    DIABETA /MICRONASE     Take 2 tablets by mouth 2 times daily        HYDROcodone-acetaminophen 5-325 MG per tablet    NORCO    20 tablet    Take 1 tablet by mouth every 6 hours as needed for pain Maximum of 4000 mg of acetaminophen in 24 hours.    Post-operative state       LANTUS VIAL 100 UNIT/ML injection   Generic drug:  insulin glargine      Inject 70 Units Subcutaneous At Bedtime        latanoprost 0.005 % ophthalmic solution    XALATAN    3 Bottle    Place 1 drop Into the left eye At Bedtime    POAG (primary open-angle glaucoma)       LIPITOR 10 MG tablet   Generic drug:  atorvastatin      Take 10 mg by mouth daily        LOSARTAN POTASSIUM PO      Take 100 mg by mouth daily        NORVASC 2.5 MG tablet   Generic drug:  amLODIPine      Take 1 tablet by mouth daily        * ofloxacin 0.3 % ophthalmic solution    OCUFLOX    1 Bottle    Place 1 drop into the right eye 4 times daily    Post corneal transplant, History of penetrating keratoplasty       * ofloxacin 0.3 % ophthalmic solution    OCUFLOX    1 Bottle    Place 1 drop into the right eye 4 times daily    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       * PRED FORTE 1 % ophthalmic susp   Generic drug:  prednisoLONE acetate     1 Bottle    Place 1 drop into the right eye 2 times daily    Post-operative state       * prednisoLONE acetate 1 % ophthalmic susp    PRED FORTE    5 mL    Place 1 drop into the right eye 2 times daily    ERM OD (epiretinal membrane, right eye), History of retinal detachment       PRILOSEC OTC PO      Take 20 mg by mouth daily        REFRESH 1.4-0.6 % ophthalmic solution   Generic drug:  polyvinyl alcohol-povidone      Place 1-2 drops into the right eye as needed        REFRESH P.M. Oint      Apply 1 strip to eye At Bedtime        triamcinolone 0.1 % cream    KENALOG     2 times daily        * Notice:  This list has 4 medication(s) that are the same as other medications prescribed for you. Read the  directions carefully, and ask your doctor or other care provider to review them with you.

## 2017-07-11 ENCOUNTER — OFFICE VISIT (OUTPATIENT)
Dept: OPHTHALMOLOGY | Facility: CLINIC | Age: 62
End: 2017-07-11
Attending: OPHTHALMOLOGY
Payer: COMMERCIAL

## 2017-07-11 DIAGNOSIS — Z86.69 HISTORY OF RETINAL DETACHMENT: ICD-10-CM

## 2017-07-11 DIAGNOSIS — H35.371 ERM OD (EPIRETINAL MEMBRANE, RIGHT EYE): ICD-10-CM

## 2017-07-11 DIAGNOSIS — Z96.1 PSEUDOPHAKIA OF BOTH EYES: Primary | ICD-10-CM

## 2017-07-11 PROCEDURE — 92134 CPTRZ OPH DX IMG PST SGM RTA: CPT | Mod: ZF | Performed by: OPHTHALMOLOGY

## 2017-07-11 PROCEDURE — 99212 OFFICE O/P EST SF 10 MIN: CPT | Mod: 25,ZF

## 2017-07-11 RX ORDER — PREDNISOLONE ACETATE 10 MG/ML
1 SUSPENSION/ DROPS OPHTHALMIC 2 TIMES DAILY
Qty: 5 ML | Refills: 1 | Status: SHIPPED | OUTPATIENT
Start: 2017-07-11 | End: 2017-12-12

## 2017-07-11 ASSESSMENT — VISUAL ACUITY
OD_CC: 20/300
OS_CC: 20/250
CORRECTION_TYPE: GLASSES
METHOD: SNELLEN - LINEAR

## 2017-07-11 ASSESSMENT — TONOMETRY
OS_IOP_MMHG: 13
IOP_METHOD: TONOPEN
OD_IOP_MMHG: 14

## 2017-07-11 ASSESSMENT — REFRACTION_WEARINGRX
OS_CYLINDER: +1.25
OD_SPHERE: PLANO
OS_AXIS: 175
OS_SPHERE: +0.25

## 2017-07-11 ASSESSMENT — SLIT LAMP EXAM - LIDS: COMMENTS: NORMAL

## 2017-07-11 ASSESSMENT — CUP TO DISC RATIO
OD_RATIO: 0.2
OS_RATIO: 0.2

## 2017-07-11 ASSESSMENT — EXTERNAL EXAM - RIGHT EYE: OD_EXAM: NORMAL

## 2017-07-11 ASSESSMENT — CONF VISUAL FIELD
OS_SUPERIOR_TEMPORAL_RESTRICTION: 3
OD_SUPERIOR_TEMPORAL_RESTRICTION: 3
OS_SUPERIOR_NASAL_RESTRICTION: 3

## 2017-07-11 ASSESSMENT — EXTERNAL EXAM - LEFT EYE: OS_EXAM: NORMAL

## 2017-07-11 NOTE — MR AVS SNAPSHOT
After Visit Summary   2017    Lex Oates    MRN: [id number]           Patient Information     Date Of Birth          1955        Visit Information        Provider Department      2017 8:30 AM Yolette Perez MD Eye Clinic        Today's Diagnoses     Pseudophakia of both eyes    -  1    ERM OD (epiretinal membrane, right eye)        History of retinal detachment           Follow-ups after your visit        Follow-up notes from your care team     Return in about 4 months (around 2017) for Retina Clinic, OCT OU, CME.      Your next 10 appointments already scheduled     2017  9:45 AM CST   RETURN RETINA with Yolette Perez MD   Eye Clinic ([facility])    [address]  [address]  [address]  [facility] [id number] [phone]              Who to contact     Please call your clinic at [phone] to:    Ask questions about your health    Make or cancel appointments    Discuss your medicines    Learn about your test results    Speak to your doctor   If you have compliments or concerns about an experience at your clinic, or if you wish to file a complaint, please contact [facility] Patient Relations at [phone] or email us at [email or web address]         Additional Information About Your Visit        MyChart Information     [employer] is an electronic gateway that provides easy, online access to your medical records. With [employer], you can request a clinic appointment, read your test results, renew a prescription or communicate with your care team.     To sign up for [employer] visit the website at [email or web address]   You will be asked to enter the access code listed below, as well as some personal information. Please follow the directions to create your username and password.     Your access code is: [email or web address]  Expires: 2017  6:30 AM     Your access code will  in  90 days. If you need help or a new code, please contact your [facility] or call [phone] for assistance.        Care EveryWhere ID     This is your Care EveryWhere ID. This could be used by other organizations to access your [place] medical records  [id number]         Blood Pressure from Last 3 Encounters:   10/30/15 137/72   01/26/15 152/78   02/25/14 164/82    Weight from Last 3 Encounters:   10/30/15 103.7 kg (228 lb 9.9 oz)   01/22/15 111.7 kg (246 lb 3.2 oz)   02/25/14 114 kg (251 lb 5.2 oz)              We Performed the Following     OCT Retina Spectralis OU (both eyes)        Primary Care Provider Office Phone # Fax #    Torin Robles [phone]       [facility] [address] [facility] [address]        Equal Access to Services     MAURICE [facility] HospitalNEIL : Hadii aad ku hadasho Sonicole, waaxda luqadaha, qaybta kaalmada adeegyada, waxay mjin hayaan beck jj . So [facility] [phone].    ATENCIÓN: Si habla español, tiene a giraldo disposición servicios gratuitos de asistencia lingüística. Elizabeth al [phone].    We comply with applicable federal civil rights laws and [place] laws. We do not discriminate on the basis of race, color, national origin, age, disability sex, sexual orientation or gender identity.            Thank you!     Thank you for choosing EYE CLINIC  for your care. Our goal is always to provide you with excellent care. Hearing back from our patients is one way we can continue to improve our services. Please take a few minutes to complete the written survey that you may receive in the mail after your visit with us. Thank you!             Your Updated Medication List - Protect others around you: Learn how to safely use, store and throw away your medicines at www.disposemymeds.org.          This list is accurate as of: 7/11/17  9:56 AM.  Always use your most recent med list.                   Brand Name Dispense Instructions for use  Diagnosis    ASPIRIN PO      Take 81 mg by mouth daily        ATIVAN PO      Take 0.5 mg by mouth 3 times daily as needed for anxiety        glyBURIDE 1.25 MG tablet    DIABETA /MICRONASE     Take 2 tablets by mouth 2 times daily        HYDROcodone-acetaminophen 5-325 MG per tablet    NORCO    20 tablet    Take 1 tablet by mouth every 6 hours as needed for pain Maximum of 4000 mg of acetaminophen in 24 hours.    Post-operative state       LANTUS VIAL 100 UNIT/ML injection   Generic drug:  insulin glargine      Inject 70 Units Subcutaneous At Bedtime        latanoprost 0.005 % ophthalmic solution    XALATAN    3 Bottle    Place 1 drop Into the left eye At Bedtime    POAG (primary open-angle glaucoma)       LIPITOR 10 MG tablet   Generic drug:  atorvastatin      Take 10 mg by mouth daily        LOSARTAN POTASSIUM PO      Take 100 mg by mouth daily        NORVASC 2.5 MG tablet   Generic drug:  amLODIPine      Take 1 tablet by mouth daily        * ofloxacin 0.3 % ophthalmic solution    OCUFLOX    1 Bottle    Place 1 drop into the right eye 4 times daily    Post corneal transplant, History of penetrating keratoplasty       * ofloxacin 0.3 % ophthalmic solution    OCUFLOX    1 Bottle    Place 1 drop into the right eye 4 times daily    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       * PRED FORTE 1 % ophthalmic susp   Generic drug:  prednisoLONE acetate     1 Bottle    Place 1 drop into the right eye 2 times daily    Post-operative state       * prednisoLONE acetate 1 % ophthalmic susp    PRED FORTE    5 mL    Place 1 drop into the right eye 2 times daily    ERM OD (epiretinal membrane, right eye), History of retinal detachment       PRILOSEC OTC PO      Take 20 mg by mouth daily        REFRESH 1.4-0.6 % ophthalmic solution   Generic drug:  polyvinyl alcohol-povidone      Place 1-2 drops into the right eye as needed        REFRESH P.M. Oint      Apply 1 strip to eye At Bedtime        triamcinolone 0.1 % cream    KENALOG     2 times  daily        * Notice:  This list has 4 medication(s) that are the same as other medications prescribed for you. Read the directions carefully, and ask your doctor or other care provider to review them with you.

## 2017-07-11 NOTE — NURSING NOTE
Chief Complaints and History of Present Illnesses   Patient presents with     Follow Up For     s/p PPV/MP/AC washout/FAX OD for ERM 12/22/16      HPI    Affected eye(s):  Both   Symptoms:        Duration:  4 months   Frequency:  Constant       Do you have eye pain now?:  No      Comments:  Pt. States that he is doing well.  VA has improved LE since laser.   No c/o comfort BE.  Faith Garza COT 8:34 AM July 11, 2017

## 2017-07-11 NOTE — PROGRESS NOTES
CC - ERM    INTERVAL HISTORY -  VA OD improving since SHANIQUA. Left eye with peaked pupil and vitreous to corneal wound, s/p Yag vitreolysis     HPI:    62 yo male POM #7 s/p PPV/MP/AC washout/FAX OD for ERM 12/22/16   Mr. Oates is a 61  year old male s/p silicone oil removal right eye 1/25/2015, prior  PPV/SO replacement for RRD (5/1/14).  Retinal detachment right eye s/p repair x 4, most recent Pars plana vitrectomy (PPV)/SO 5/1/14    PAST OCULAR SURGERY  YAG vitreolysis OS 6/27/17 ([facility])  PPV/MS/FAx/AC washout OD 12/22/16 for SO in AC and severe ERM  pentrating keratoplasty (PKP) right eye with intraocular lens exchange 6/17/15  Pars plana vitrectomy (PPV)/SO removal /FAx 1/25/2015 right eye   Pars plana vitrectomy (PPV)/SO placement 5/1/2014 right eye   Pars plana vitrectomy (PPV)/SO removal/FGx C3F8 2/25/14 (DDK) right eye   PPV/SO 9/19/13 (DDK) right eye   S/p 7/15, 8/16 Pars plana vitrectomy (PPV) and SBP with FGx (Max Alcon) OD  S/p pentrating keratoplasty (PKP) 3/26/13 (noé)  S/p laser in situ keratomileusis (LASIK) both eyes 1994  S/p Radial keratotomy (RK) both eyes 1994  S/p Cataract extraction intraocular lens both eyes 2003, 2004  Sp Retinal detachment  Repair left eye 1994    Ocular meds:  Latanoprost 1 drop nightly both eyes  PFATs as needed   PF OS 4x/day  PF OD 2/day    RETINAL IMAGING   OCT  7-11-17  Right eye- minimal edema, stable, minimal distortion  Left eye- 2-3+ cystoid macular edema, retina flat. Poor quality image. Appears stable    ASSESSMENT & PLAN  1.  H/o ERM OD   - s/p PPV/MS/AC washout/FAX OD 12/22/16   - IOP good, retina flat      - Continue PF 2x daily (for PKP)   - continue xalatan OU    - RTC 4 months     2.  H/o RD OD (multiple)   - S/p Pars plana vitrectomy (PPV)/SO removal/FAx/STS  right eye 1/25/15   - S/P prior Pars plana vitrectomy (PPV)/SO placement 5/1/14 right eye   - Retina flat. Breaks nasal, superior, and inferotemporal with pexy   - Intraocular pressure OK,  retina flat    3.  Cystoid macular edema left eye   - carolynley chronic,    - s/p YAG vitreolysis 6/27/17 by [facility]   - trial of PF 2x daily - started 6/2017   - re-check in 4 months with OCT       4.   s/p Retinal detachment repair left eye, 1994   -no breaks/tears, flat   -observe      5.  pentrating keratoplasty (PKP) OD     - s/p pentrating keratoplasty (PKP)/IOL exchange OD 6/2015   - follows with [facility], doing well   - last saw [facility] 7/2017   - baseline PF OD 2/day    6.  ocular hypertension both eyes   - Pressures stable 14/13 today   - Continue Latanoprost  1 drop nightly OU   - Continue to monitor    7.  Pseudophakia - both eyes    In good position.    8.  DM no DR   - has had extensive laser for RD OU    RTC 4 months, OCT OU    Wesley Nelson MD  PGY3, Dept of Ophthalmology  Pager [phone]    ATTESTATION     Attending Physician Attestation:      Complete documentation of historical and exam elements from today's encounter can be found in the full encounter summary report (not reduplicated in this progress note).  I personally obtained the chief complaint(s) and history of present illness.  I confirmed and edited as necessary the review of systems, past medical/surgical history, family history, social history, and examination findings as documented by others; and I examined the patient myself.  I personally reviewed the relevant tests, images, and reports as documented above.  I formulated and edited as necessary the assessment and plan and discussed the findings and management plan with the patient and family        Yolette Perez MD, PhD  , Vitreoretinal Surgery  Department of Ophthalmology  [facility]

## 2017-08-25 DIAGNOSIS — H40.1190 POAG (PRIMARY OPEN-ANGLE GLAUCOMA): ICD-10-CM

## 2017-08-25 DIAGNOSIS — H40.1131 PRIMARY OPEN ANGLE GLAUCOMA OF BOTH EYES, MILD STAGE: Primary | ICD-10-CM

## 2017-08-25 NOTE — TELEPHONE ENCOUNTER
latanoprost (XALATAN) 0.005 % ophthalmic solution    Last Written Prescription Date:  8/11/16  Last Fill Quantity: 3 bottle,   # refills: 3  Last Office Visit with G, P or [facility] prescribing provider: 7/11/17  Future Office visit:   11/17/17    Progress Notes      CC - ERM     INTERVAL HISTORY -  VA OD improving since SHANIQUA. Left eye with peaked pupil and vitreous to corneal wound, s/p Yag vitreolysis      HPI:    62 yo male POM #7 s/p PPV/MP/AC washout/FAX OD for ERM 12/22/16   Mr. Oates is a 61  year old male s/p silicone oil removal right eye 1/25/2015, prior  PPV/SO replacement for RRD (5/1/14).  Retinal detachment right eye s/p repair x 4, most recent Pars plana vitrectomy (PPV)/SO 5/1/14     PAST OCULAR SURGERY  YAG vitreolysis OS 6/27/17 ([facility])  PPV/MS/FAx/AC washout OD 12/22/16 for SO in AC and severe ERM  pentrating keratoplasty (PKP) right eye with intraocular lens exchange 6/17/15  Pars plana vitrectomy (PPV)/SO removal /FAx 1/25/2015 right eye   Pars plana vitrectomy (PPV)/SO placement 5/1/2014 right eye   Pars plana vitrectomy (PPV)/SO removal/FGx C3F8 2/25/14 (DDK) right eye   PPV/SO 9/19/13 (DDK) right eye   S/p 7/15, 8/16 Pars plana vitrectomy (PPV) and SBP with FGx (Tay Alcon) OD  S/p pentrating keratoplasty (PKP) 3/26/13 (noé)  S/p laser in situ keratomileusis (LASIK) both eyes 1994  S/p Radial keratotomy (RK) both eyes 1994  S/p Cataract extraction intraocular lens both eyes 2003, 2004  Sp Retinal detachment  Repair left eye 1994     Ocular meds:  Latanoprost 1 drop nightly both eyes  PFATs as needed   PF OS 4x/day  PF OD 2/day     RETINAL IMAGING   OCT  7-11-17  Right eye- minimal edema, stable, minimal distortion  Left eye- 2-3+ cystoid macular edema, retina flat. Poor quality image. Appears stable     ASSESSMENT & PLAN  1.  H/o ERM OD                        - s/p PPV/MS/AC washout/FAX OD 12/22/16                        - IOP good, retina flat                            - Continue  PF 2x daily (for PKP)                        - continue xalatan OU                         - RTC 4 months      2.  H/o RD OD (multiple)                        - S/p Pars plana vitrectomy (PPV)/SO removal/FAx/STS  right eye 1/25/15                        - S/P prior Pars plana vitrectomy (PPV)/SO placement 5/1/14 right eye                        - Retina flat. Breaks nasal, superior, and inferotemporal with pexy                        - Intraocular pressure OK, retina flat     3.  Cystoid macular edema left eye                        - carolynley chronic,                         - s/p YAG vitreolysis 6/27/17 by [facility]                        - trial of PF 2x daily - started 6/2017                        - re-check in 4 months with OCT         4.   s/p Retinal detachment repair left eye, 1994                        -no breaks/tears, flat                        -observe        5.  pentrating keratoplasty (PKP) OD                          - s/p pentrating keratoplasty (PKP)/IOL exchange OD 6/2015                        - follows with [facility], doing well                        - last saw [facility] 7/2017                        - baseline PF OD 2/day     6.  ocular hypertension both eyes                        - Pressures stable 14/13 today                        - Continue Latanoprost  1 drop nightly OU                        - Continue to monitor     7.  Pseudophakia - both eyes                         In good position.     8.  DM no DR                        - has had extensive laser for RD OU     RTC 4 months, OCT OU     Wesley Nelson MD  PGY3, Dept of Ophthalmology  Pager [phone]               Routing refill request to provider for review/approval because:  latanoprost (XALATAN) 0.005 % ophthalmic solution. Order changed to both eyes hs per note.please verify. thanks.

## 2017-08-26 RX ORDER — LATANOPROST 50 UG/ML
1 SOLUTION/ DROPS OPHTHALMIC AT BEDTIME
Qty: 7.5 ML | Refills: 3 | Status: SHIPPED | OUTPATIENT
Start: 2017-08-26 | End: 2018-10-02

## 2017-12-08 DIAGNOSIS — Z86.69 HISTORY OF RETINAL DETACHMENT: ICD-10-CM

## 2017-12-08 NOTE — TELEPHONE ENCOUNTER
Pred Forte  Last Written Prescription Date:  7/11/17  Last Fill Quantity: 5 ml,   # refills: 1  Last Office Visit: 7/11/17  Future Office visit:   No future appt  Last Note:Progress Notes  Encounter Date: 7/11/2017  Yolette Perez MD   Ophthalmology      []Hide copied text  CC - ERM     INTERVAL HISTORY -  VA OD improving since SHANIQUA. Left eye with peaked pupil and vitreous to corneal wound, s/p Yag vitreolysis      HPI:    60 yo male POM #7 s/p PPV/MP/AC washout/FAX OD for ERM 12/22/16   Mr. Oates is a 61  year old male s/p silicone oil removal right eye 1/25/2015, prior  PPV/SO replacement for RRD (5/1/14).  Retinal detachment right eye s/p repair x 4, most recent Pars plana vitrectomy (PPV)/SO 5/1/14     PAST OCULAR SURGERY  YAG vitreolysis OS 6/27/17 (Page)  PPV/MS/FAx/AC washout OD 12/22/16 for SO in AC and severe ERM  pentrating keratoplasty (PKP) right eye with intraocular lens exchange 6/17/15  Pars plana vitrectomy (PPV)/SO removal /FAx 1/25/2015 right eye   Pars plana vitrectomy (PPV)/SO placement 5/1/2014 right eye   Pars plana vitrectomy (PPV)/SO removal/FGx C3F8 2/25/14 (DDK) right eye   PPV/SO 9/19/13 (DDK) right eye   S/p 7/15, 8/16 Pars plana vitrectomy (PPV) and SBP with FGx (Max Alcon) OD  S/p pentrating keratoplasty (PKP) 3/26/13 (noé)  S/p laser in situ keratomileusis (LASIK) both eyes 1994  S/p Radial keratotomy (RK) both eyes 1994  S/p Cataract extraction intraocular lens both eyes 2003, 2004  Sp Retinal detachment  Repair left eye 1994     Ocular meds:  Latanoprost 1 drop nightly both eyes  PFATs as needed   PF OS 4x/day  PF OD 2/day     RETINAL IMAGING   OCT  7-11-17  Right eye- minimal edema, stable, minimal distortion  Left eye- 2-3+ cystoid macular edema, retina flat. Poor quality image. Appears stable     ASSESSMENT & PLAN  1.  H/o ERM OD                        - s/p PPV/MS/AC washout/FAX OD 12/22/16                        - IOP good, retina flat                             - Continue PF 2x daily (for PKP)                        - continue xalatan OU                         - RTC 4 months      2.  H/o RD OD (multiple)                        - S/p Pars plana vitrectomy (PPV)/SO removal/FAx/STS  right eye 1/25/15                        - S/P prior Pars plana vitrectomy (PPV)/SO placement 5/1/14 right eye                        - Retina flat. Breaks nasal, superior, and inferotemporal with pexy                        - Intraocular pressure OK, retina flat     3.  Cystoid macular edema left eye                        - carolynley chronic,                         - s/p YAG vitreolysis 6/27/17 by [facility]                        - trial of PF 2x daily - started 6/2017                        - re-check in 4 months with OCT         4.   s/p Retinal detachment repair left eye, 1994                        -no breaks/tears, flat                        -observe        5.  pentrating keratoplasty (PKP) OD                          - s/p pentrating keratoplasty (PKP)/IOL exchange OD 6/2015                        - follows with [facility], doing well                        - last saw [facility] 7/2017                        - baseline PF OD 2/day     6.  ocular hypertension both eyes                        - Pressures stable 14/13 today                        - Continue Latanoprost  1 drop nightly OU                        - Continue to monitor     7.  Pseudophakia - both eyes                         In good position.     8.  DM no DR                        - has had extensive laser for RD OU     RTC 4 months, OCT OU     Wesley Nelson MD  PGY3, Dept of Ophthalmology  Pager [phone]     ATTESTATION      Attending Physician Attestation:       Complete documentation of historical and exam elements from today's encounter can be found in the full encounter summary report (not reduplicated in this progress note).  I personally obtained the chief complaint(s) and history of present illness.  I confirmed and edited as  necessary the review of systems, past medical/surgical history, family history, social history, and examination findings as documented by others; and I examined the patient myself.  I personally reviewed the relevant tests, images, and reports as documented above.  I formulated and edited as necessary the assessment and plan and discussed the findings and management plan with the patient and family           Yolette Perez MD, PhD  , Vitreoretinal Surgery  Department of Ophthalmology  [facility]               Routing refill request to provider for review/approval because:  Drug not on the G, P or  Health refill protocol or controlled substance

## 2017-12-12 DIAGNOSIS — Z94.7 CORNEA REPLACED BY TRANSPLANT: Primary | ICD-10-CM

## 2017-12-12 DIAGNOSIS — Z86.69 HISTORY OF RETINAL DETACHMENT: ICD-10-CM

## 2017-12-12 RX ORDER — PREDNISOLONE ACETATE 10 MG/ML
1 SUSPENSION/ DROPS OPHTHALMIC 2 TIMES DAILY
Qty: 5 ML | Refills: 3 | Status: SHIPPED | OUTPATIENT
Start: 2017-12-12 | End: 2018-10-02

## 2017-12-12 RX ORDER — PREDNISOLONE ACETATE 10 MG/ML
1 SUSPENSION/ DROPS OPHTHALMIC 2 TIMES DAILY
Qty: 5 ML | Refills: 1 | OUTPATIENT
Start: 2017-12-12

## 2017-12-12 NOTE — TELEPHONE ENCOUNTER
Please make appointment with Retina clinic for follow up    Pt is following with Dr Worthy.   Annita Roque COT 5:36 PM December 14, 2017

## 2018-03-22 ENCOUNTER — TELEPHONE (OUTPATIENT)
Dept: OPHTHALMOLOGY | Facility: CLINIC | Age: 63
End: 2018-03-22

## 2018-03-22 NOTE — TELEPHONE ENCOUNTER
Pt seen locally in [place] recently.  H/o cornea transplant and recently having more loose sutures and dry area on cornea-- recent bandage lens placed  Requesting f/u with dr. Petersen    Scheduled evaluation with dr. Petersen tomorrow  Pt would also like to see dr. mcdowell for h/o retina detachments in both eyes and macular edema same day  Able to accommodate    Increase eye pressure on left eye per pt/wife at local appts-- no glaucoma drops, but prednisolone has been increased in dosage    Nils Warner RN 12:25 PM 03/22/18

## 2018-03-22 NOTE — TELEPHONE ENCOUNTER
----- Message from Betty Tirado sent at 3/22/2018 11:57 AM CDT -----  Regarding: Pt's wife, Bhavya, requesting for pt to be seen ASAP with both Drs...  Contact: [phone]  [facility].  Pt has issues in both eyes, loose stitches, abrasion on cornea, excessively dry, temporary bandage contact put on by provider in [place] area, pressures up!    Please call Bhavya at [phone].    Thank you,  Jerzy MOORE    Please DO NOT send this message and/or reply back to sender.  Call Center Representatives DO NOT respond to messages.

## 2018-03-23 ENCOUNTER — OFFICE VISIT (OUTPATIENT)
Dept: OPHTHALMOLOGY | Facility: CLINIC | Age: 63
End: 2018-03-23
Attending: OPHTHALMOLOGY
Payer: COMMERCIAL

## 2018-03-23 DIAGNOSIS — Z94.7 CORNEA REPLACED BY TRANSPLANT: Primary | ICD-10-CM

## 2018-03-23 DIAGNOSIS — H33.051 OLD TOTAL RETINAL DETACHMENT OF RIGHT EYE: ICD-10-CM

## 2018-03-23 DIAGNOSIS — H40.1190 PRIMARY OPEN ANGLE GLAUCOMA, UNSPECIFIED GLAUCOMA STAGE, UNSPECIFIED LATERALITY: ICD-10-CM

## 2018-03-23 DIAGNOSIS — Z96.1 PSEUDOPHAKIA OF BOTH EYES: ICD-10-CM

## 2018-03-23 DIAGNOSIS — Z94.7 HISTORY OF PENETRATING KERATOPLASTY: ICD-10-CM

## 2018-03-23 DIAGNOSIS — H35.352 CYSTOID MACULAR EDEMA OF LEFT EYE: Primary | ICD-10-CM

## 2018-03-23 PROCEDURE — 92134 CPTRZ OPH DX IMG PST SGM RTA: CPT | Mod: ZF | Performed by: OPHTHALMOLOGY

## 2018-03-23 PROCEDURE — 40000269 ZZH STATISTIC NO CHARGE FACILITY FEE: Mod: ZF

## 2018-03-23 PROCEDURE — G0463 HOSPITAL OUTPT CLINIC VISIT: HCPCS | Mod: ZF

## 2018-03-23 RX ORDER — KETOROLAC TROMETHAMINE 5 MG/ML
1 SOLUTION OPHTHALMIC 3 TIMES DAILY
COMMUNITY
Start: 2018-03-14 | End: 2018-09-25

## 2018-03-23 ASSESSMENT — CONF VISUAL FIELD
OS_SUPERIOR_TEMPORAL_RESTRICTION: 3
OD_NORMAL: 1
METHOD: COUNTING FINGERS
OS_SUPERIOR_TEMPORAL_RESTRICTION: 3

## 2018-03-23 ASSESSMENT — SLIT LAMP EXAM - LIDS
COMMENTS: NORMAL
COMMENTS: NORMAL

## 2018-03-23 ASSESSMENT — EXTERNAL EXAM - LEFT EYE
OS_EXAM: NORMAL
OS_EXAM: NORMAL

## 2018-03-23 ASSESSMENT — VISUAL ACUITY
OS_PH_CC: 20/200
OS_CC: 20/300
METHOD: SNELLEN - LINEAR
CORRECTION_TYPE: GLASSES
OD_CC: 20/150
OS_PH_CC: 20/200
METHOD: SNELLEN - LINEAR
CORRECTION_TYPE: GLASSES
OS_CC: 20/300
OD_CC: 20/150

## 2018-03-23 ASSESSMENT — REFRACTION_WEARINGRX
OS_AXIS: 175
OS_SPHERE: +0.25
OS_AXIS: 175
OD_SPHERE: PLANO
OS_CYLINDER: +1.25
OS_CYLINDER: +1.25
OS_SPHERE: +0.25
OD_SPHERE: PLANO

## 2018-03-23 ASSESSMENT — TONOMETRY
IOP_METHOD: ICARE
OD_IOP_MMHG: 17
OS_IOP_MMHG: 21
OS_IOP_MMHG: 21
OD_IOP_MMHG: 17
IOP_METHOD: ICARE

## 2018-03-23 ASSESSMENT — CUP TO DISC RATIO
OD_RATIO: 0.2
OS_RATIO: 0.1

## 2018-03-23 ASSESSMENT — EXTERNAL EXAM - RIGHT EYE
OD_EXAM: NORMAL
OD_EXAM: NORMAL

## 2018-03-23 NOTE — LETTER
3/23/2018       RE: Lex Oates  [address]  [place] [address]     Dear Colleague,    Thank you for referring your patient, Lex Oates, to the EYE CLINIC at [facility]. Please see a copy of my visit note below.    CC - h/o RD and ERM with CME    INTERVAL HISTORY -  VA OD improving since SHANIQUA, OS stable.  Saw local optometrist and had high IOP OS no gtts started, following with Dr. 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      PAST OCULAR SURGERY  YAG vitreolysis OS 6/27/17 ([facility])  PPV/MS/FAx/AC washout OD 12/22/16 for SO in AC and severe ERM  pentrating keratoplasty (PKP) right eye with intraocular lens exchange 6/17/15  Pars plana vitrectomy (PPV)/SO removal /FAx 1/25/2015 right eye   Pars plana vitrectomy (PPV)/SO placement 5/1/2014 right eye   Pars plana vitrectomy (PPV)/SO removal/FGx C3F8 2/25/14 (DDK) right eye   PPV/SO 9/19/13 (DDK) right eye   S/p 7/15, 8/16 Pars plana vitrectomy (PPV) and SBP with FGx (Max Alcon) OD  S/p pentrating keratoplasty (PKP) 3/26/13 (noé)  S/p laser in situ keratomileusis (LASIK) both eyes 1994  S/p Radial keratotomy (RK) both eyes 1994  S/p Cataract extraction intraocular lens both eyes 2003, 2004  Sp Retinal detachment  Repair left eye 1994    GTTS  Latanoprost 1 drop nightly both eyes  PFATs as needed   PF OS 6x/day  PF OD 3/day  Ketorolac three times daily left eye    RETINAL IMAGING   OCT  03/23/18  Right eye- minimal edema, stable, minimal distortion  Left eye- 2-3+ cystoid macular edema, retina flat. Poor quality image. Appears stable    ASSESSMENT & PLAN  1.  H/o ERM OD   - s/p PPV/MS/AC washout/FAX OD 12/22/16   - IOP good, retina flat      - Continue PF three times daily (for PKP) per Dr. Petersen   - RTC 6 months (when coming to see Dr. Petersen)     2.  H/o RD OD (multiple)   - S/p Pars  plana vitrectomy (PPV)/SO removal/FAx/STS  right eye 1/25/15   - S/P prior Pars plana vitrectomy (PPV)/SO placement 5/1/14 right eye   - Retina flat. Breaks nasal, superior, and inferotemporal with pexy   - Intraocular pressure OK, retina flat    3.  Cystoid macular edema left eye   - letha chronic    - s/p YAG vitreolysis 6/27/17 by [facility]   -PF six times daily with mild improvement from July   -still severe CME   -IOP 21 and would not start durezol, IVTA, or STK   -doubt benefit from continued gtts   - reduce PF to 4/day x 2 weeks then 3/day x 2 weeks then 2/day   - see Dr. Worthy in 2 months   - if stalble would reduce gtts further if OK for cornea    4.   s/p Retinal detachment repair left eye, 1994   -no breaks/tears, flat   -observe    5.  pentrating keratoplasty (PKP) OD     - s/p pentrating keratoplasty (PKP)/IOL exchange OD 6/2015   - follows with [facility], doing well   - last saw [facility] 7/2017   - baseline PF OD 3/day    6.  ocular hypertension both eyes   - Pressures stable 14/13 today   - Continue Latanoprost  1 drop nightly OU   - Continue to monitor    7.  Pseudophakia - both eyes    In good position.    8.  DM no DR   - has had extensive laser for RD OU    RTC 6 months, OCT OU    Sj Worthy MD  Ophthalmology, PGY-3    ATTESTATION     Attending Physician Attestation:      Complete documentation of historical and exam elements from today's encounter can be found in the full encounter summary report (not reduplicated in this progress note).  I personally obtained the chief complaint(s) and history of present illness.  I confirmed and edited as necessary the review of systems, past medical/surgical history, family history, social history, and examination findings as documented by others; and I examined the patient myself.  I personally reviewed the relevant tests, images, and reports as documented above.  I personally reviewed the ophthalmic test(s) associated with this encounter, agree with the  interpretation(s) as documented by the resident/fellow, and have edited the corresponding report(s) as necessary.   I formulated and edited as necessary the assessment and plan and discussed the findings and management plan with the patient and family    Yolette Perez MD, PhD  , Vitreoretinal Surgery  Department of Ophthalmology  [facility]      Base Eye Exam     Visual Acuity (Snellen - Linear)      Right Left   Dist cc 20/150 20/300   Dist ph cc NI 20/200       Correction:  Glasses    RE: BCL and glasses      Tonometry ([facility], 8:31 AM)      Right Left   Pressure 17 21         Pupils      APD   Right None   Left None         Visual Fields (Counting fingers)      Left Right   Result  Full   Restrictions Partial superior temporal deficiency          Extraocular Movement      Right Left   Result Full Full         Neuro/Psych     Oriented x3:  Yes    Mood/Affect:  Normal            Slit Lamp and Fundus Exam     External Exam      Right Left    External Normal Normal      Slit Lamp Exam      Right Left    Lids/Lashes Normal, puncta cauterized now open mild stenosis  Normal    Conjunctiva/Sclera White and quiet White and quiet    Cornea Pentrating keratoplasty (PKP) clear centrally. Remaining sutures intact and buried, diffuse PEE  12 cut RK Scars, clear     Anterior Chamber Deep and quiet Deep and quiet    Iris Round and reactive Round and reactive    Lens glued PCIOL sitting well, centered PCIOL    Vitreous Clear PVD      Fundus Exam      Right Left    Disc Pallor Normal    C/D Ratio 0.2 0.1    Macula flat flat    Vessels Normal     Periphery extensive laser extensive laser scar, s/p buckle            Refraction     Wearing Rx      Sphere Cylinder Axis   Right plano     Left +0.25 +1.25 175                   Again, thank you for allowing me to participate in the care of your patient.      Sincerely,    Yolette Perez MD, PhD  , Vitreoretinal  Surgery  Department of Ophthalmology & Visual Neurosciences  [facility]

## 2018-03-23 NOTE — NURSING NOTE
Chief Complaints and History of Present Illnesses   Patient presents with     Follow Up For     loose stiches RE     HPI    Affected eye(s):  Both   Symptoms:     No floaters   No flashes   No redness   No Dryness   No itching         Do you have eye pain now?:  No      Comments:  Pt states vision started getting blurry in RE about 5 days ago. No eye pain currently, pt has BCL in RE today. Redness in RE over the past week.  DM2 BS: 112 this morning.  A1C: 7.1 taken in December 2017 per pt.  No results found for: A1C    Preston CH March 23, 2018 8:17 AM

## 2018-03-23 NOTE — NURSING NOTE
Chief Complaints and History of Present Illnesses   Patient presents with     Follow Up For     8 month follow up H/o ERM OD     HPI    Affected eye(s):  Both   Symptoms:     No floaters   No flashes   Dryness         Do you have eye pain now?:  No      Comments:  Pt states vision started getting blurry in RE about 5 days ago. No eye pain currently, pt has BCL in RE today. Redness in RE over the past week.  DM2 BS: 112 this morning.  A1C: 7.1 taken in December 2017 per pt.  No results found for: A1C    Preston CH March 23, 2018 8:17 AM

## 2018-03-23 NOTE — PROGRESS NOTES
CC - h/o RD and ERM with CME    INTERVAL HISTORY -  VA OD improving since SHANIQUA, OS stable.  Saw local optometrist and had high IOP OS no gtts started, following with Dr. 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      PAST OCULAR SURGERY  YAG vitreolysis OS 6/27/17 ([facility])  PPV/MS/FAx/AC washout OD 12/22/16 for SO in AC and severe ERM  pentrating keratoplasty (PKP) right eye with intraocular lens exchange 6/17/15  Pars plana vitrectomy (PPV)/SO removal /FAx 1/25/2015 right eye   Pars plana vitrectomy (PPV)/SO placement 5/1/2014 right eye   Pars plana vitrectomy (PPV)/SO removal/FGx C3F8 2/25/14 ([facility]) right eye   PPV/SO 9/19/13 ([facility]) right eye   S/p 7/15, 8/16 Pars plana vitrectomy (PPV) and SBP with FGx (Max Alcon) OD  S/p pentrating keratoplasty (PKP) 3/26/13 (noé)  S/p laser in situ keratomileusis (LASIK) both eyes 1994  S/p Radial keratotomy (RK) both eyes 1994  S/p Cataract extraction intraocular lens both eyes 2003, 2004  Sp Retinal detachment  Repair left eye 1994    GTTS  Latanoprost 1 drop nightly both eyes  PFATs as needed   PF OS 6x/day  PF OD 3/day  Ketorolac three times daily left eye    RETINAL IMAGING   OCT  03/23/18  Right eye- minimal edema, stable, minimal distortion  Left eye- 2-3+ cystoid macular edema, retina flat. Poor quality image. Appears stable    ASSESSMENT & PLAN  1.  H/o ERM OD   - s/p PPV/MS/AC washout/FAX OD 12/22/16   - IOP good, retina flat      - Continue PF three times daily (for PKP) per Dr. Petersen   - RTC 6 months (when coming to see Dr. Petersen)     2.  H/o RD OD (multiple)   - S/p Pars plana vitrectomy (PPV)/SO removal/FAx/STS  right eye 1/25/15   - S/P prior Pars plana vitrectomy (PPV)/SO placement 5/1/14 right eye   - Retina flat. Breaks nasal, superior, and inferotemporal with pexy   - Intraocular pressure OK, retina  flat    3.  Cystoid macular edema left eye   - carolynley chronic    - s/p YAG vitreolysis 6/27/17 by [facility]   -PF six times daily with mild improvement from July   -still severe CME   -IOP 21 and would not start durezol, IVTA, or STK   -doubt benefit from continued gtts   - reduce PF to 4/day x 2 weeks then 3/day x 2 weeks then 2/day   - see Dr. Worthy in 2 months   - if stalble would reduce gtts further if OK for cornea    4.   s/p Retinal detachment repair left eye, 1994   -no breaks/tears, flat   -observe    5.  pentrating keratoplasty (PKP) OD     - s/p pentrating keratoplasty (PKP)/IOL exchange OD 6/2015   - follows with [facility], doing well   - last saw [facility] 7/2017   - baseline PF OD 3/day    6.  ocular hypertension both eyes   - Pressures stable 14/13 today   - Continue Latanoprost  1 drop nightly OU   - Continue to monitor    7.  Pseudophakia - both eyes    In good position.    8.  DM no DR   - has had extensive laser for RD OU    RTC 6 months, OCT OU    Sj Worthy MD  Ophthalmology, PGY-3    ATTESTATION     Attending Physician Attestation:      Complete documentation of historical and exam elements from today's encounter can be found in the full encounter summary report (not reduplicated in this progress note).  I personally obtained the chief complaint(s) and history of present illness.  I confirmed and edited as necessary the review of systems, past medical/surgical history, family history, social history, and examination findings as documented by others; and I examined the patient myself.  I personally reviewed the relevant tests, images, and reports as documented above.  I personally reviewed the ophthalmic test(s) associated with this encounter, agree with the interpretation(s) as documented by the resident/fellow, and have edited the corresponding report(s) as necessary.   I formulated and edited as necessary the assessment and plan and discussed the findings and management plan with the patient and  family    Yolette Perez MD, PhD  , Vitreoretinal Surgery  Department of Ophthalmology  [facility]

## 2018-03-23 NOTE — MR AVS SNAPSHOT
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      Your next 10 appointments already scheduled     Sep 25, 2018  8:45 AM CDT   RETURN CORNEA with Sj Petersen MD   Eye Clinic ([facility])    [address]  [facility] [address]   [phone]            Sep 25, 2018  9:45 AM CDT   RETURN RETINA with Yolette Perez MD   Eye Clinic ([facility])    [address]  [facility] [address] [phone]            Sep 25, 2018 12:15 PM CDT   (Arrive by 12:00 PM)   RETURN PLASTICS with Javy Rodarte MD   [facility] Ophthalmology ([email or web address])    [address] [facility]  [address] [facility] [address] [phone]              Who to contact     Please call your clinic at [phone] to:    Ask questions about your health    Make or cancel appointments    Discuss your medicines    Learn about your test results    Speak to your doctor            Additional Information About Your Visit        [employer] Information     [employer] gives you secure access to your electronic health record. If you see a primary care provider, you can also send messages to your care team and make appointments. If you have questions, please call your primary care clinic.  If you do not have a primary care provider, please call [phone] and they will assist you.      [employer] is an electronic gateway that provides easy, online access to your medical records. With [employer], you can request  a clinic appointment, read your test results, renew a prescription or communicate with your care team.     To access your existing account, please contact your [facility] or call [phone] for assistance.        Care EveryWhere ID     This is your Care EveryWhere ID. This could be used by other organizations to access your [place] medical records  [id number]         Blood Pressure from Last 3 Encounters:   10/30/15 137/72   01/26/15 152/78   02/25/14 164/82    Weight from Last 3 Encounters:   10/30/15 103.7 kg (228 lb 9.9 oz)   01/22/15 111.7 kg (246 lb 3.2 oz)   02/25/14 114 kg (251 lb 5.2 oz)              Today, you had the following     No orders found for display         Today's Medication Changes          These changes are accurate as of 3/23/18 11:20 AM.  If you have any questions, ask your nurse or doctor.               These medicines have changed or have updated prescriptions.        Dose/Directions    prednisoLONE acetate 1 % ophthalmic susp   Commonly known as:  PRED FORTE   This may have changed:  Another medication with the same name was removed. Continue taking this medication, and follow the directions you see here.   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 [address]        Equal Access to Services     LEXUS ALFREDO : Hadii delta sorensono Sonicole, waaxda luqadaha, qaybta kaalmada ki keller. So [facility] [place] [phone].    ATENCIÓN: Si habla español, tiene a giraldo disposición servicios gratuitos de asistencia lingüística. Llame al [phone].    We comply with applicable federal civil rights laws and [place] laws. We do not discriminate on  the basis of race, color, national origin, age, disability, sex, sexual orientation, or gender identity.            Thank you!     Thank you for choosing EYE CLINIC  for your care. Our goal is always to provide you with excellent care. Hearing back from our patients is one way we can continue to improve our services. Please take a few minutes to complete the written survey that you may receive in the mail after your visit with us. Thank you!             Your Updated Medication List - Protect others around you: Learn how to safely use, store and throw away your medicines at www.disposemymeds.org.          This list is accurate as of 3/23/18 11:20 AM.  Always use your most recent med list.                   Brand Name Dispense Instructions for use Diagnosis    ASPIRIN PO      Take 81 mg by mouth daily        ATIVAN PO      Take 0.5 mg by mouth 3 times daily as needed for anxiety        glyBURIDE 1.25 MG tablet    DIABETA /MICRONASE     Take 2 tablets by mouth 2 times daily        HYDROcodone-acetaminophen 5-325 MG per tablet    NORCO    20 tablet    Take 1 tablet by mouth every 6 hours as needed for pain Maximum of 4000 mg of acetaminophen in 24 hours.    Post-operative state       ketorolac 0.5 % ophthalmic solution    ACULAR     Place 1 drop Into the left eye 3 times daily        LANTUS VIAL 100 UNIT/ML injection   Generic drug:  insulin glargine      Inject 70 Units Subcutaneous At Bedtime        latanoprost 0.005 % ophthalmic solution    XALATAN    7.5 mL    Place 1 drop into both eyes At Bedtime    Primary open angle glaucoma of both eyes, mild stage       LIPITOR 10 MG tablet   Generic drug:  atorvastatin      Take 10 mg by mouth daily        LOSARTAN POTASSIUM PO      Take 100 mg by mouth daily        NORVASC 2.5 MG tablet   Generic drug:  amLODIPine      Take 1 tablet by mouth daily        prednisoLONE acetate 1 % ophthalmic susp    PRED FORTE    5 mL    Place 1 drop into the right eye 2 times daily    Cornea  replaced by transplant       PRILOSEC OTC PO      Take 20 mg by mouth daily        REFRESH 1.4-0.6 % ophthalmic solution   Generic drug:  polyvinyl alcohol-povidone      Place 1-2 drops into the right eye as needed        REFRESH P.M. Oint      Apply 1 strip to eye At Bedtime        triamcinolone 0.1 % cream    KENALOG     2 times daily

## 2018-03-23 NOTE — PROGRESS NOTES
CC - recheck for K transplant    INTERVAL HISTORY -  VA OD improving since SHANIQUA    HPI:    60 yo male POM #3 s/p PPV/MP/AC washout/FAX OD for ERM 12/22/16   Mr. Oates is a 61  year old male s/p silicone oil removal right eye 1/25/2015, prior  PPV/SO replacement for RRD (5/1/14).  Retinal detachment right eye s/p repair x 4, most recent Pars plana vitrectomy (PPV)/SO 5/1/14    Ocular surgery:  PPV/MS/FAx/AC washout OD 12/22/16 for SO in AC and severe ERM  pentrating keratoplasty (PKP) right eye with intraocular lens exchange 6/17/15  Pars plana vitrectomy (PPV)/SO removal /FAx 1/25/2015 right eye   Pars plana vitrectomy (PPV)/SO placement 5/1/2014 right eye   Pars plana vitrectomy (PPV)/SO removal/FGx C3F8 2/25/14 ([facility]) right eye   PPV/SO 9/19/13 ([facility]) right eye   S/p 7/15, 8/16 Pars plana vitrectomy (PPV) and SBP with FGx (Scoupon Alcon) OD  S/p pentrating keratoplasty (PKP) 3/26/13 (noé)  S/p laser in situ keratomileusis (LASIK) both eyes 1994  S/p Radial keratotomy (RK) both eyes 1994  S/p Cataract extraction intraocular lens both eyes 2003, 2004  Sp Retinal detachment  Repair left eye 1994    Gtts:  Latanoprost at bedtime both eyes  Pred three times a day OD, 6x/day OS  Ketorolac three times a day OS  Systane nighttime at bedtime both eyes      ASSESSMENT & PLAN  1.   pentrating keratoplasty (PKP) right eye.    - s/p pentrating keratoplasty (PKP)/IOL exchange 6/2015   -loose sutures removed in [place], looks good today   -bandage contact lens removed   -no infiltrate but dry surface   -continue Pred three times a day (partly for cystoid macular edema)    2.  H/o RD OD (multiple)   - S/p Pars plana vitrectomy (PPV)/SO removal/FAx/STS  right eye 1/25/15   - follows with Dr. MARKS    3.  Cystoid macular edema left eye   - likeley chronic    -follows with Dr. MARKS, continue drops per retina       5.   s/p Retinal detachment repair left eye, 1994   -no breaks/tears   -observe    6.  ocular hypertension both eyes   -  Pressures stable. Continue Latanoprost  1 drop nightly OU     7.  Pseudophakia - both eyes    In good position.    F/u 6 months    Lorenzo Adam, DO  Cornea Fellow      ~~~~~~~~~~~~~~~~~~~~~~~~~~~~~~~~~~~~~~~~~~~~~~~~~~~~~~~~~~~~~~~~    Complete documentation of historical and exam elements from today's encounter can be found in the full encounter summary report (not reduplicated in this progress note). I personally obtained the chief complaint(s) and history of present illness.  I confirmed and edited as necessary the review of systems, past medical/surgical history, family history, social history, and examination findings as documented by others.  I examined the patient myself, and I personally reviewed the relevant tests, images, and reports as documented above. I formulated and edited as necessary the assessment and plan and discussed the findings and management plan with the patient and family.     Sj Petersen MD, MA  Director, Cornea & Anterior Segment  [facility] Department of Ophthalmology & Visual Neuroscience

## 2018-03-23 NOTE — MR AVS SNAPSHOT
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      Your next 10 appointments already scheduled     Sep 25, 2018  8:45 AM CDT   RETURN CORNEA with Sj Petersen MD   Eye Clinic ([facility])    [address] [address]   [phone]            Sep 25, 2018  9:45 AM CDT   RETURN RETINA with Yolette Perez MD   Eye Clinic ([facility])    [address] [address]   [phone]            Sep 25, 2018 12:15 PM CDT   (Arrive by 12:00 PM)   RETURN PLASTICS with Javy Rodarte MD   [facility] Ophthalmology ([facility])    [address] [address] [phone]              Who to contact     Please call your clinic at [phone] to:    Ask questions about your health    Make or cancel appointments    Discuss your medicines    Learn about your test results    Speak to your doctor            Additional Information About Your Visit        MyChart Information     [employer] gives you secure access to your electronic health record. If you see a primary care provider, you can also send messages to your care team and make appointments. If you have questions, please call your  primary care clinic.  If you do not have a primary care provider, please call [phone] and they will assist you.      [employer] is an electronic gateway that provides easy, online access to your medical records. With [employer], you can request a clinic appointment, read your test results, renew a prescription or communicate with your care team.     To access your existing account, please contact your [facility] or call [phone] for assistance.        Care EveryWhere ID     This is your Care EveryWhere ID. This could be used by other organizations to access your [place] medical records  [id number]         Blood Pressure from Last 3 Encounters:   10/30/15 137/72   01/26/15 152/78   02/25/14 164/82    Weight from Last 3 Encounters:   10/30/15 103.7 kg (228 lb 9.9 oz)   01/22/15 111.7 kg (246 lb 3.2 oz)   02/25/14 114 kg (251 lb 5.2 oz)              We Performed the Following     OCT Retina Spectralis OU (both eyes)          Today's Medication Changes          These changes are accurate as of 3/23/18 11:28 AM.  If you have any questions, ask your nurse or doctor.               These medicines have changed or have updated prescriptions.        Dose/Directions    prednisoLONE acetate 1 % ophthalmic susp   Commonly known as:  PRED FORTE   This may have changed:  Another medication with the same name was removed. Continue taking this medication, and follow the directions you see here.   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 So [facility] [phone].    ATENCIÓN: Si jef ledesma, tiene a giraldo disposición servicios gratuitos de asistencia lingüística. Elizabeth alston [phone].    We comply with applicable federal civil rights laws and [place] laws. We do not discriminate on the basis of race, color, national origin, age, disability, sex, sexual orientation, or gender identity.            Thank you!     Thank you for choosing EYE CLINIC  for your care. Our goal is always to provide you with excellent care. Hearing back from our patients is one way we can continue to improve our services. Please take a few minutes to complete the written survey that you may receive in the mail after your visit with us. Thank you!             Your Updated Medication List - Protect others around you: Learn how to safely use, store and throw away your medicines at www.disposemymeds.org.          This list is accurate as of 3/23/18 11:28 AM.  Always use your most recent med list.                   Brand Name Dispense Instructions for use Diagnosis    ASPIRIN PO      Take 81 mg by mouth daily        ATIVAN PO      Take 0.5 mg by mouth 3 times daily as needed for anxiety        glyBURIDE 1.25 MG tablet    DIABETA /MICRONASE     Take 2 tablets by mouth 2 times daily        HYDROcodone-acetaminophen 5-325 MG per tablet    NORCO    20 tablet    Take 1 tablet by mouth every 6 hours as needed for pain Maximum of 4000 mg of acetaminophen in 24 hours.    Post-operative state       ketorolac 0.5 % ophthalmic solution    ACULAR     Place 1 drop Into the left eye 3 times daily        LANTUS VIAL 100 UNIT/ML injection   Generic drug:  insulin glargine      Inject 70 Units Subcutaneous At Bedtime        latanoprost 0.005 % ophthalmic solution    XALATAN    7.5 mL    Place 1 drop into both eyes At Bedtime    Primary open angle glaucoma of both eyes, mild stage       LIPITOR 10 MG tablet   Generic drug:  atorvastatin      Take 10 mg by mouth daily        LOSARTAN POTASSIUM PO       Take 100 mg by mouth daily        NORVASC 2.5 MG tablet   Generic drug:  amLODIPine      Take 1 tablet by mouth daily        prednisoLONE acetate 1 % ophthalmic susp    PRED FORTE    5 mL    Place 1 drop into the right eye 2 times daily    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 Oint      Apply 1 strip to eye At Bedtime        triamcinolone 0.1 % cream    KENALOG     2 times daily

## 2018-09-24 ENCOUNTER — PATIENT OUTREACH (OUTPATIENT)
Dept: CARE COORDINATION | Facility: CLINIC | Age: 63
End: 2018-09-24

## 2018-09-25 ENCOUNTER — DOCUMENTATION ONLY (OUTPATIENT)
Dept: OPHTHALMOLOGY | Facility: CLINIC | Age: 63
End: 2018-09-25

## 2018-09-25 ENCOUNTER — OFFICE VISIT (OUTPATIENT)
Dept: OPHTHALMOLOGY | Facility: CLINIC | Age: 63
End: 2018-09-25
Payer: COMMERCIAL

## 2018-09-25 ENCOUNTER — OFFICE VISIT (OUTPATIENT)
Dept: OPHTHALMOLOGY | Facility: CLINIC | Age: 63
End: 2018-09-25
Attending: OPHTHALMOLOGY
Payer: COMMERCIAL

## 2018-09-25 VITALS — WEIGHT: 237 LBS | HEIGHT: 70 IN | BODY MASS INDEX: 33.93 KG/M2

## 2018-09-25 DIAGNOSIS — H35.353 CYSTOID MACULAR DEGENERATION OF RETINA, BILATERAL: Primary | ICD-10-CM

## 2018-09-25 DIAGNOSIS — Z94.7 HISTORY OF PENETRATING KERATOPLASTY: Primary | ICD-10-CM

## 2018-09-25 DIAGNOSIS — H02.402 INVOLUTIONAL PTOSIS, ACQUIRED, LEFT: ICD-10-CM

## 2018-09-25 DIAGNOSIS — H33.051 OLD TOTAL RETINAL DETACHMENT OF RIGHT EYE: ICD-10-CM

## 2018-09-25 PROCEDURE — 92134 CPTRZ OPH DX IMG PST SGM RTA: CPT | Mod: ZF | Performed by: OPHTHALMOLOGY

## 2018-09-25 PROCEDURE — 92015 DETERMINE REFRACTIVE STATE: CPT | Mod: ZF

## 2018-09-25 PROCEDURE — G0463 HOSPITAL OUTPT CLINIC VISIT: HCPCS | Mod: ZF

## 2018-09-25 RX ORDER — MINERAL OIL, WHITE PETROLATUM .03; .94 G/G; G/G
OINTMENT OPHTHALMIC AT BEDTIME
COMMUNITY

## 2018-09-25 RX ORDER — CYCLOSPORINE 0.5 MG/ML
1 EMULSION OPHTHALMIC PRN
COMMUNITY

## 2018-09-25 ASSESSMENT — REFRACTION_MANIFEST
OS_SPHERE: PLANO
OS_CYLINDER: +1.50
OD_AXIS: 106
OS_CYLINDER: +1.50
OD_CYLINDER: +2.00
OS_AXIS: 175
OD_SPHERE: -2.00
OD_SPHERE: +1.50
OS_SPHERE: +3.50
OD_SPHERE: +1.50
OS_SPHERE: +3.50
OS_AXIS: 175
OS_CYLINDER: +1.50
OD_CYLINDER: +2.00
OS_AXIS: 175
OD_CYLINDER: +2.00
OD_AXIS: 106
OD_SPHERE: -2.00
OD_CYLINDER: +2.00
OD_AXIS: 106
OS_SPHERE: PLANO
OD_AXIS: 106
OS_AXIS: 175
OS_CYLINDER: +1.50

## 2018-09-25 ASSESSMENT — REFRACTION_WEARINGRX
OS_CYLINDER: +1.25
OS_CYLINDER: +1.25
OS_SPHERE: +0.25
OS_AXIS: 175
OS_SPHERE: +0.25
OS_AXIS: 175
OD_SPHERE: PLANO
OD_SPHERE: PLANO

## 2018-09-25 ASSESSMENT — TONOMETRY
IOP_METHOD: TONOPEN
OS_IOP_MMHG: 14
OD_IOP_MMHG: 13
IOP_METHOD: TONOPEN
OD_IOP_MMHG: 13
OS_IOP_MMHG: 14

## 2018-09-25 ASSESSMENT — EXTERNAL EXAM - RIGHT EYE: OD_EXAM: NORMAL

## 2018-09-25 ASSESSMENT — EXTERNAL EXAM - LEFT EYE: OS_EXAM: NORMAL

## 2018-09-25 ASSESSMENT — CONF VISUAL FIELD
OD_INFERIOR_NASAL_RESTRICTION: 3
OD_INFERIOR_NASAL_RESTRICTION: 3
OD_SUPERIOR_TEMPORAL_RESTRICTION: 3
OD_SUPERIOR_TEMPORAL_RESTRICTION: 3
OD_SUPERIOR_NASAL_RESTRICTION: 3
OD_SUPERIOR_NASAL_RESTRICTION: 3

## 2018-09-25 ASSESSMENT — VISUAL ACUITY
CORRECTION_TYPE: GLASSES
OD_CC: 20/300
METHOD: SNELLEN - LINEAR
OS_PH_CC: 20/150
OD_CC: 20/300
OS_PH_CC: 20/150
CORRECTION_TYPE: GLASSES
METHOD: SNELLEN - LINEAR
OS_CC: 20/250
OS_CC: 20/250

## 2018-09-25 ASSESSMENT — PACHYMETRY
OD_CT(UM): 534
OS_CT(UM): 609

## 2018-09-25 ASSESSMENT — CUP TO DISC RATIO
OS_RATIO: 0.1
OD_RATIO: 0.2

## 2018-09-25 ASSESSMENT — SLIT LAMP EXAM - LIDS: COMMENTS: NORMAL

## 2018-09-25 NOTE — NURSING NOTE
Chief Complaints and History of Present Illnesses   Patient presents with     Follow Up For     H/O multiple RD in right eye     HPI    Affected eye(s):  Both   Symptoms:     No decreased vision   No flashes      Duration:  6 months   Frequency:  Constant       Do you have eye pain now?:  No      Comments:  Follow up for H/O multiple RD in right eye.  NOA Gilliland 9:50 AM 09/25/2018

## 2018-09-25 NOTE — PROGRESS NOTES
CC - h/o RD and ERM with CME    INTERVAL HISTORY -  VA stable in both eyes. Following with Dr. KRISSY Worthy regularly.  Lost sense of smell recently, being evaluated with MRI    HPI:    62 yo male with h/o multiple surgeries OD for RD, most recently ERM peel OD 12/2016  H/o RD repair OS  H/o mulptiple cornea surgery OD & RK OS      PAST OCULAR SURGERY  YAG vitreolysis OS 6/27/17 ([facility])  PPV/MS/FAx/AC washout OD 12/22/16 for SO in AC and severe ERM  pentrating keratoplasty (PKP) right eye with intraocular lens exchange 6/17/15  Pars plana vitrectomy (PPV)/SO removal /FAx 1/25/2015 right eye   Pars plana vitrectomy (PPV)/SO placement 5/1/2014 right eye   Pars plana vitrectomy (PPV)/SO removal/FGx C3F8 2/25/14 ([facility]) right eye   PPV/SO 9/19/13 ([facility]) right eye   S/p 7/15, 8/16 Pars plana vitrectomy (PPV) and SBP with FGx (Max Alcon) OD  S/p pentrating keratoplasty (PKP) 3/26/13 (noé)  S/p laser in situ keratomileusis (LASIK) both eyes 1994  S/p Radial keratotomy (RK) both eyes 1994  S/p Cataract extraction intraocular lens both eyes 2003, 2004  Sp Retinal detachment  Repair left eye 1994    GTTS  Latanoprost 1 drop nightly both eyes  Restasis  PF BID OD      RETINAL IMAGING   OCT 9-25-18  Right eye- minimal edema, Epiretinal membrane, minimal distortion - stable  Left eye- cystoid macular edema - more nasally, improved central,     ASSESSMENT & PLAN  1.  H/o ERM OD   - s/p PPV/MS/AC washout/FAX OD 12/22/16   - IOP good, retina flat      - Continue PF BID times daily (for PKP) per Dr. Petersen   - RTC 6 months (when coming to see Dr. Petersen)     2.  H/o RD OD (multiple)   - S/p Pars plana vitrectomy (PPV)/SO removal/FAx/STS  right eye 1/25/15   - S/P prior Pars plana vitrectomy (PPV)/SO placement 5/1/14 right eye   - Retina flat. Breaks nasal, superior, and inferotemporal with pexy   - Intraocular pressure OK, retina flat    3.  Cystoid macular edema left eye   -letha chronic    -s/p YAG vitreolysis 6/27/17 by [facility]   -off  PF now   -cystoid macular edema looks improved compared to 6 months ago   -Monitor, follows with Dr. Worthy    4.   s/p Retinal detachment repair left eye, 1994   -no breaks/tears, flat   -observe    5.  pentrating keratoplasty (PKP) OD     - s/p pentrating keratoplasty (PKP)/IOL exchange OD 6/2015   - follows with [facility], doing well   - next apt 10/2/18   - baseline PF OD 2/day    6.  ocular hypertension both eyes   - Pressures stable 13/14 today   - Continue Latanoprost  1 drop nightly OU   - Continue to monitor    7.  Pseudophakia - both eyes    In good position.    8.  DM no DR   - has had extensive laser for RD OU    RTC 6 months, OCT OU    Samir Kim MD  PGY-3 Ophthalmology Resident  [phone]    ATTESTATION     Attending Physician Attestation:      Complete documentation of historical and exam elements from today's encounter can be found in the full encounter summary report (not reduplicated in this progress note).  I personally obtained the chief complaint(s) and history of present illness.  I confirmed and edited as necessary the review of systems, past medical/surgical history, family history, social history, and examination findings as documented by others; and I examined the patient myself.  I personally reviewed the relevant tests, images, and reports as documented above.  I personally reviewed the ophthalmic test(s) associated with this encounter, agree with the interpretation(s) as documented by the resident/fellow, and have edited the corresponding report(s) as necessary.   I formulated and edited as necessary the assessment and plan and discussed the findings and management plan with the patient and family    Yolette Perez MD, PhD  , Vitreoretinal Surgery  Department of Ophthalmology  [facility]

## 2018-09-25 NOTE — PROGRESS NOTES
Met with patient to schedule surgery with Dr. Javy Rodarte.    Surgery was scheduled on 10/19 at [facility] due to location preference.    Patient will have H&P at [facility]  Post-Op care appointment was scheduled on 12/18  Patient is aware a / is needed day of surgery.   Patient received surgery packet has my direct contact information for any further questions.

## 2018-09-25 NOTE — MR AVS SNAPSHOT
After Visit Summary   9/25/2018    Lex Oates    MRN: [id number]           Patient Information     Date Of Birth          1955        Visit Information        Provider Department      9/25/2018 9:45 AM Yolette Perez MD Eye Clinic        Today's Diagnoses     Cystoid macular degeneration of retina, bilateral    -  1       Follow-ups after your visit        Follow-up notes from your care team     Return in about 6 months (around 3/25/2019) for DFE, Macular OCT, OCT OU.      Your next 10 appointments already scheduled     Sep 25, 2018 12:15 PM CDT   (Arrive by 12:00 PM)   RETURN PLASTICS with Javy Rodarte MD   [facility] Ophthalmology ([facility] [place])    [address]  4th Floor  [facility] [address] [phone]            Oct 02, 2018  1:45 PM CDT   RETURN CORNEA with Sj Petersen MD   Eye Clinic ([facility])    [address] [place] [address] [phone]            Mar 26, 2019 10:15 AM CDT   RETURN RETINA with Yolette Perez MD   Eye Clinic ([facility])    [address] [place] [address] [phone]              Future tests that were ordered for you today     Open Future Orders        Priority Expected Expires Ordered    OCT Retina Spectralis OU (both eyes) Routine  3/28/2020 9/25/2018            Who to contact     Please call your clinic at [phone] to:    Ask questions about your health    Make or cancel appointments    Discuss your medicines    Learn about your test results    Speak to your doctor            Additional Information About Your Visit        MyChart Information     [employer] gives you secure access to your electronic health record. If you see a primary care provider, you can also send messages to your care team and make appointments. If you have questions, please call your  primary care clinic.  If you do not have a primary care provider, please call [phone] and they will assist you.      fruux is an electronic gateway that provides easy, online access to your medical records. With fruux, you can request a clinic appointment, read your test results, renew a prescription or communicate with your care team.     To access your existing account, please contact your [facility] or call [phone] for assistance.        Care EveryWhere ID     This is your Care EveryWhere ID. This could be used by other organizations to access your [facility] medical records  [id number]         Blood Pressure from Last 3 Encounters:   10/30/15 137/72   01/26/15 152/78   02/25/14 164/82    Weight from Last 3 Encounters:   10/30/15 103.7 kg (228 lb 9.9 oz)   01/22/15 111.7 kg (246 lb 3.2 oz)   02/25/14 114 kg (251 lb 5.2 oz)              We Performed the Following     OCT Retina Spectralis OU (both eyes)          Today's Medication Changes          These changes are accurate as of 9/25/18 11:36 AM.  If you have any questions, ask your nurse or doctor.               Stop taking these medicines if you haven't already. Please contact your care team if you have questions.     ketorolac 0.5 % ophthalmic solution   Commonly known as:  ACULAR   Stopped by:  Yolette Perez MD           REFRESH P.M. Oint   Stopped by:  Yolette Perez MD                    Primary Care Provider Office Phone # Fax #    Torin W Travis [phone]       [facility] [address] [facility] [address]        Equal Access to Services     [facility] : Hadii delta ku hadasho Sonicole, waaxda luqadaha, qaybta kaalmada ki keller. So [facility] [phone].    ATENCIÓN: Si habla español, tiene a giraldo disposición servicios gratuitos de asistencia lingüística. Llame al [phone].    We comply with applicable federal civil rights laws  and [place] laws. We do not discriminate on the basis of race, color, national origin, age, disability, sex, sexual orientation, or gender identity.            Thank you!     Thank you for choosing EYE CLINIC  for your care. Our goal is always to provide you with excellent care. Hearing back from our patients is one way we can continue to improve our services. Please take a few minutes to complete the written survey that you may receive in the mail after your visit with us. Thank you!             Your Updated Medication List - Protect others around you: Learn how to safely use, store and throw away your medicines at www.disposemymeds.org.          This list is accurate as of 9/25/18 11:36 AM.  Always use your most recent med list.                   Brand Name Dispense Instructions for use Diagnosis    ASPIRIN PO      Take 81 mg by mouth daily        ATIVAN PO      Take 0.5 mg by mouth 3 times daily as needed for anxiety        cycloSPORINE 0.05 % ophthalmic emulsion    RESTASIS     Place 1 drop into the right eye as needed        GENTEAL TEARS NIGHT-TIME Oint ophthalmic ointment      Apply to eye At Bedtime        glyBURIDE 1.25 MG tablet    DIABETA /MICRONASE     Take 2 tablets by mouth 2 times daily        HYDROcodone-acetaminophen 5-325 MG per tablet    NORCO    20 tablet    Take 1 tablet by mouth every 6 hours as needed for pain Maximum of 4000 mg of acetaminophen in 24 hours.    Post-operative state       LANTUS VIAL 100 UNIT/ML injection   Generic drug:  insulin glargine      Inject 70 Units Subcutaneous At Bedtime        latanoprost 0.005 % ophthalmic solution    XALATAN    7.5 mL    Place 1 drop into both eyes At Bedtime    Primary open angle glaucoma of both eyes, mild stage       LIPITOR 10 MG tablet   Generic drug:  atorvastatin      Take 10 mg by mouth daily        LOSARTAN POTASSIUM PO      Take 100 mg by mouth daily        NORVASC 2.5 MG tablet   Generic drug:  amLODIPine      Take 1 tablet by mouth  daily        prednisoLONE acetate 1 % ophthalmic susp    PRED FORTE    5 mL    Place 1 drop into the right eye 2 times daily    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

## 2018-09-25 NOTE — PROGRESS NOTES
Oculoplastic Clinic Patient    Patient: Lex Oates MRN# [id number]   YOB: 1955 Age: 63 year old   Date of Visit: Sep 25, 2018    CC: Droopy eyelids obstructing vision.  Chief Complaints and History of Present Illnesses   Patient presents with     Consult For     eyelid ptosis, left                 HPI:     Lex Oates previously underwent ptosis repair with me back in 2015 on the right eye. He feels that eye has done well. About six months ago started noticing the left upper eyelid drooping. Since 2015 he has undergone Pars plana vitrectomy (PPV) twice in the right eye. He is using numerous drops in the left. The droopy eyelid is interfering with activities of daily living including driving, and reading. The patient denies double vision, variability of the eyelid position. He does have a moderately droopy right upper eyelid. Back in 2015 I also performed ectropion repair on the right, and he has had no tearing.     EXAM:     MRD1: 3 right eye and 1 left eye   Aponeurotic ptosis    VISUAL FIELD: [place]  Left eye untaped:5 degrees Left eye taped:40 degrees    Assessment & Plan     Lex Oates is a 63 year old male with the following diagnoses:   1. History of penetrating keratoplasty    2. 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 Involutional ptosis, acquired, left         Left ptosis repair MMCR 9 mm    ANTICOAGULATION:  Aspirin 81 MG  Can hold       PHOTOS DEMONSTRATE:    Aponeurotic blepharoptosis      Attending Physician Attestation:  Complete documentation of historical and exam elements from today's encounter can be found in the full encounter summary report (not reduplicated in this progress note).  I personally obtained the chief complaint(s) and history of present illness.  I confirmed and edited as necessary the review of systems, past medical/surgical history, family history, social history, and examination findings as documented by others; and I examined the patient  myself.  I personally reviewed the relevant tests, images, and reports as documented above.  I formulated and edited as necessary the assessment and plan and discussed the findings and management plan with the patient and family. - Javy Rodarte MD

## 2018-09-25 NOTE — NURSING NOTE
Chief Complaints and History of Present Illnesses   Patient presents with     Consult For     eyelid      HPI    Affected eye(s):  Left   Location:  Upper   Symptoms:     Difficulty with reading   Difficulty watching television   No difficulty with driving   No puffy eyes   No redness   No foreign body sensation   No tearing   No eye discharge         Do you have eye pain now?:  No      Comments:  Consult of upper eyelid, left eye.  Droopy left upper eyelid, blocks some vision.  History of :  Right upper eyelid herman muscle conjunctival resection , Right lower eyelid ectropion repair and punctal cautery -10/30/2015.  Eye meds: Latanaprost each eye at bedtime, Restasis left eye only, Refresh each eye   PAO Stafford 9/25/2018 12:22 PM

## 2018-09-25 NOTE — MR AVS SNAPSHOT
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        Involutional ptosis, acquired, left           Follow-ups after your visit        Your next 10 appointments already scheduled     Oct 02, 2018  1:45 PM CDT   RETURN CORNEA with Sj Petersen MD   Eye Clinic ([facility])    [address] [address]   [phone]            Dec 18, 2018  9:15 AM CST   (Arrive by 9:00 AM)   Post-Op with Javy Rodarte MD   [facility] Ophthalmology ([facility])    [address] [facility]  [address] [facility] [address] [phone]            Mar 26, 2019 10:15 AM CDT   RETURN RETINA with Yolette Perez MD   Eye Clinic ([facility])    [address] [address]   [phone]              Future tests that were ordered for you today     Open Future Orders        Priority Expected Expires Ordered    OCT Retina Spectralis OU (both eyes) Routine  3/28/2020 9/25/2018            Who to contact     Please call your clinic at [phone] to:    Ask questions about your health    Make or cancel appointments    Discuss your medicines    Learn about your test results    Speak to your doctor            Additional Information About Your Visit        MyChart Information     [employer] gives you secure access to your electronic health record. If you see a primary care provider, you can also send messages to your care team and make appointments. If you have questions, please call your primary care clinic.  If you do not have a  "primary care provider, please call [phone] and they will assist you.      Hellotravel is an electronic gateway that provides easy, online access to your medical records. With Hell[employer], you can request a clinic appointment, read your test results, renew a prescription or communicate with your care team.     To access your existing account, please contact your [facility] or call [phone] for assistance.        Care EveryWhere ID     This is your Care EveryWhere ID. This could be used by other organizations to access your [facility] medical records  [id number]        Your Vitals Were     Height BMI (Body Mass Index)                1.778 m (5' 10\") 34.01 kg/m2           Blood Pressure from Last 3 Encounters:   10/30/15 137/72   01/26/15 152/78   02/25/14 164/82    Weight from Last 3 Encounters:   09/25/18 107.5 kg (237 lb)   10/30/15 103.7 kg (228 lb 9.9 oz)   01/22/15 111.7 kg (246 lb 3.2 oz)              We Performed the Following     External Photos OU (both eyes)     [place]illo VF Ptosis OS     Mirna-Operative Worksheet (Plastics)          Today's Medication Changes          These changes are accurate as of 9/25/18  1:08 PM.  If you have any questions, ask your nurse or doctor.               Stop taking these medicines if you haven't already. Please contact your care team if you have questions.     ketorolac 0.5 % ophthalmic solution   Commonly known as:  ACULAR   Stopped by:  Yolette Perez MD           REFRESH P.M. Oint   Stopped by:  Yolette Perez MD                    Primary Care Provider Office Phone # Fax #    Torin Robles [phone]       [facility] [address] [facility] [address]        Equal Access to Services     LEXUS ALFREDO : Chelsey Pearl, tanya vicente, ki lockwood. So [facility] [place] [phone].    ATENCIÓN: Si habla español, tiene a giraldo disposición servicios " ruth ann de asistencia lingüística. Elizabeth alston [phone].    We comply with applicable federal civil rights laws and [place] laws. We do not discriminate on the basis of race, color, national origin, age, disability, sex, sexual orientation, or gender identity.            Thank you!     Thank you for choosing [facility] OPHTHALMOLOGY  for your care. Our goal is always to provide you with excellent care. Hearing back from our patients is one way we can continue to improve our services. Please take a few minutes to complete the written survey that you may receive in the mail after your visit with us. Thank you!             Your Updated Medication List - Protect others around you: Learn how to safely use, store and throw away your medicines at www.disposemymeds.org.          This list is accurate as of 9/25/18  1:08 PM.  Always use your most recent med list.                   Brand Name Dispense Instructions for use Diagnosis    ASPIRIN PO      Take 81 mg by mouth daily        ATIVAN PO      Take 0.5 mg by mouth 3 times daily as needed for anxiety        cycloSPORINE 0.05 % ophthalmic emulsion    RESTASIS     Place 1 drop into the right eye as needed        GENTEAL TEARS NIGHT-TIME Oint ophthalmic ointment      Apply to eye At Bedtime        glyBURIDE 1.25 MG tablet    DIABETA /MICRONASE     Take 2 tablets by mouth 2 times daily        HYDROcodone-acetaminophen 5-325 MG per tablet    NORCO    20 tablet    Take 1 tablet by mouth every 6 hours as needed for pain Maximum of 4000 mg of acetaminophen in 24 hours.    Post-operative state       LANTUS VIAL 100 UNIT/ML injection   Generic drug:  insulin glargine      Inject 70 Units Subcutaneous At Bedtime        latanoprost 0.005 % ophthalmic solution    XALATAN    7.5 mL    Place 1 drop into both eyes At Bedtime    Primary open angle glaucoma of both eyes, mild stage       LIPITOR 10 MG tablet   Generic drug:  atorvastatin      Take 10 mg by mouth daily        LOSARTAN  POTASSIUM PO      Take 100 mg by mouth daily        NORVASC 2.5 MG tablet   Generic drug:  amLODIPine      Take 1 tablet by mouth daily        prednisoLONE acetate 1 % ophthalmic susp    PRED FORTE    5 mL    Place 1 drop into the right eye 2 times daily    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

## 2018-10-02 ENCOUNTER — OFFICE VISIT (OUTPATIENT)
Dept: OPHTHALMOLOGY | Facility: CLINIC | Age: 63
End: 2018-10-02
Attending: OPHTHALMOLOGY
Payer: COMMERCIAL

## 2018-10-02 DIAGNOSIS — H40.1131 PRIMARY OPEN ANGLE GLAUCOMA OF BOTH EYES, MILD STAGE: ICD-10-CM

## 2018-10-02 DIAGNOSIS — Z94.7 CORNEA REPLACED BY TRANSPLANT: Primary | ICD-10-CM

## 2018-10-02 PROCEDURE — 92025 CPTRIZED CORNEAL TOPOGRAPHY: CPT | Mod: ZF | Performed by: OPHTHALMOLOGY

## 2018-10-02 PROCEDURE — G0463 HOSPITAL OUTPT CLINIC VISIT: HCPCS | Mod: ZF

## 2018-10-02 RX ORDER — PREDNISOLONE ACETATE 10 MG/ML
1 SUSPENSION/ DROPS OPHTHALMIC 2 TIMES DAILY
Qty: 5 ML | Refills: 11 | Status: SHIPPED | OUTPATIENT
Start: 2018-10-02 | End: 2020-03-03

## 2018-10-02 RX ORDER — LATANOPROST 50 UG/ML
1 SOLUTION/ DROPS OPHTHALMIC AT BEDTIME
Qty: 7.5 ML | Refills: 11 | Status: SHIPPED | OUTPATIENT
Start: 2018-10-02

## 2018-10-02 ASSESSMENT — SLIT LAMP EXAM - LIDS: COMMENTS: NORMAL

## 2018-10-02 ASSESSMENT — EXTERNAL EXAM - RIGHT EYE: OD_EXAM: NORMAL

## 2018-10-02 ASSESSMENT — EXTERNAL EXAM - LEFT EYE: OS_EXAM: NORMAL

## 2018-10-02 ASSESSMENT — VISUAL ACUITY
CORRECTION_TYPE: GLASSES
OD_CC: 20/300
METHOD: SNELLEN - LINEAR
OS_CC: 20/300

## 2018-10-02 ASSESSMENT — TONOMETRY
OS_IOP_MMHG: 26
OD_IOP_MMHG: 19
IOP_METHOD: TONOPEN
OD_IOP_MMHG: --
OS_IOP_MMHG: 22
OS_IOP_MMHG: 27
IOP_METHOD: TONOPEN
IOP_METHOD: TONOPEN

## 2018-10-02 ASSESSMENT — CONF VISUAL FIELD
OS_NORMAL: 1
OD_INFERIOR_TEMPORAL_RESTRICTION: 3
OD_INFERIOR_NASAL_RESTRICTION: 3

## 2018-10-02 ASSESSMENT — REFRACTION_WEARINGRX
OD_SPHERE: PLANO
OD_CYLINDER: SPHERE
OS_SPHERE: +0.25
OS_AXIS: 175
OS_CYLINDER: +1.25
SPECS_TYPE: SVL

## 2018-10-02 NOTE — MR AVS SNAPSHOT
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    -  1    Primary open angle glaucoma of both eyes, mild stage           Follow-ups after your visit        Your next 10 appointments already scheduled     Oct 19, 2018   Procedure with Javy Rodarte MD   [facility] (--)    [address]  [facility] [address]   [phone]            Dec 18, 2018  9:15 AM CST   (Arrive by 9:00 AM)   Post-Op with Javy Rodarte MD   [facility] Ophthalmology ([facility])    [address] [address] [phone]            Mar 26, 2019  9:15 AM CDT   RETURN CORNEA with Sj Petersen MD   Eye Clinic ([facility])    [address] [address] [phone]            Mar 26, 2019 10:15 AM CDT   RETURN RETINA with Yolette Perez MD   Eye Clinic ([facility])    [address] [address] [phone]              Who to contact     Please call your clinic at [phone] to:    Ask questions about your health    Make or cancel appointments    Discuss your medicines    Learn about your test results    Speak to your doctor            Additional Information About Your Visit        MyChart Information     [employer] gives you secure access to your electronic health record. If you see a primary care provider, you can also send messages to your care team and make appointments. If you have questions, please call your primary care clinic.  If you do not have a primary care provider, please call [phone] and they will assist  you.      [employer] is an electronic gateway that provides easy, online access to your medical records. With [employer], you can request a clinic appointment, read your test results, renew a prescription or communicate with your care team.     To access your existing account, please contact your [facility] or call [phone] for assistance.        Care EveryWhere ID     This is your Care EveryWhere ID. This could be used by other organizations to access your [facility] medical records  [id number]         Blood Pressure from Last 3 Encounters:   10/30/15 137/72   01/26/15 152/78   02/25/14 164/82    Weight from Last 3 Encounters:   09/25/18 107.5 kg (237 lb)   10/30/15 103.7 kg (228 lb 9.9 oz)   01/22/15 111.7 kg (246 lb 3.2 oz)              We Performed the Following     Corneal Topography OD (right eye)          Where to get your medicines      These medications were sent to [facility]/pharmacy #[id number]  [place] [facility] [address]  [address] [place] [address]     Phone:  [phone]     latanoprost 0.005 % ophthalmic solution    prednisoLONE acetate 1 % ophthalmic susp          Primary Care Provider Office Phone # Fax #    Torin oRbles [phone]       [facility] [address] [place] [address]        Equal Access to Services     LEXUS ALFREDO : Hadii delta ku hadasho Solulali, waaxda luqadaha, qaybta kaalmada adeegyada, ki jj . So [facility] [phone].    ATENCIÓN: Si habla español, tiene a giraldo disposición servicios gratuitos de asistencia lingüística. Llame al [phone].    We comply with applicable federal civil rights laws and [place] laws. We do not discriminate on the basis of race, color, national origin, age, disability, sex, sexual orientation, or gender identity.            Thank you!     Thank you for choosing EYE CLINIC  for your care. Our goal is always to provide you with excellent care. Hearing back  from our patients is one way we can continue to improve our services. Please take a few minutes to complete the written survey that you may receive in the mail after your visit with us. Thank you!             Your Updated Medication List - Protect others around you: Learn how to safely use, store and throw away your medicines at www.disposemymeds.org.          This list is accurate as of 10/2/18  2:20 PM.  Always use your most recent med list.                   Brand Name Dispense Instructions for use Diagnosis    ASPIRIN PO      Take 81 mg by mouth daily        ATIVAN PO      Take 0.5 mg by mouth 3 times daily as needed for anxiety        cycloSPORINE 0.05 % ophthalmic emulsion    RESTASIS     Place 1 drop into the right eye as needed        GENTEAL TEARS NIGHT-TIME Oint ophthalmic ointment      Apply to eye At Bedtime        glyBURIDE 1.25 MG tablet    DIABETA /MICRONASE     Take 2 tablets by mouth 2 times daily        HYDROcodone-acetaminophen 5-325 MG per tablet    NORCO    20 tablet    Take 1 tablet by mouth every 6 hours as needed for pain Maximum of 4000 mg of acetaminophen in 24 hours.    Post-operative state       LANTUS VIAL 100 UNIT/ML injection   Generic drug:  insulin glargine      Inject 70 Units Subcutaneous At Bedtime        latanoprost 0.005 % ophthalmic solution    XALATAN    7.5 mL    Place 1 drop into both eyes At Bedtime    Primary open angle glaucoma of both eyes, mild stage       LIPITOR 10 MG tablet   Generic drug:  atorvastatin      Take 10 mg by mouth daily        LOSARTAN POTASSIUM PO      Take 100 mg by mouth daily        NORVASC 2.5 MG tablet   Generic drug:  amLODIPine      Take 1 tablet by mouth daily        prednisoLONE acetate 1 % ophthalmic susp    PRED FORTE    5 mL    Place 1 drop into the right eye 2 times daily    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

## 2018-10-02 NOTE — NURSING NOTE
Chief Complaints and History of Present Illnesses   Patient presents with     Follow Up For     s/p penetrating keratoplasty (PKP)/IOL exchange 6/17/15 right eye     HPI    Affected eye(s):  Right   Symptoms:     No decreased vision   No floaters   No flashes      Duration:  6 months   Frequency:  Constant       Do you have eye pain now?:  No      Comments:  Pt here for 6 month f/u; s/p PKP with IOL right eye 6/17/15  Pt reports no change in vision noted - had MRx done with Kris's visit last week (wonders if he should get new glasses?)    Ocular meds:  Pred Forte BID, right eye  Latanoprost at bedtime, both eyes  Restasis BID, both eyes  Refresh Optive Maco-3 at bedtime  PFAT's PRN    Loretta Jordan COA 12:56 PM October 2, 2018

## 2018-10-02 NOTE — PROGRESS NOTES
CC - recheck for K transplant     INTERVAL HISTORY -  Doing well since last visit. Saw Retina and plastics last week. CME improved - stopped ketorolac. Noted to have ptosis left eye- planning for surgical repair left eye with Dr. Rodarte in 2 weeks.    Vision stable. No eye pain or redness.      HPI:    60 yo male s/p PPV/MP/AC washout/FAX OD for ERM 12/22/16   Mr. Oates is a 61  year old male s/p silicone oil removal right eye 1/25/2015, prior  PPV/SO replacement for RRD (5/1/14).  Retinal detachment right eye s/p repair x 4, most recent Pars plana vitrectomy (PPV)/SO 5/1/14     Ocular surgery:  PPV/MS/FAx/AC washout OD 12/22/16 for SO in AC and severe ERM  pentrating keratoplasty (PKP) right eye with intraocular lens exchange 6/17/15  Pars plana vitrectomy (PPV)/SO removal /FAx 1/25/2015 right eye   Pars plana vitrectomy (PPV)/SO placement 5/1/2014 right eye   Pars plana vitrectomy (PPV)/SO removal/FGx C3F8 2/25/14 (DDK) right eye   PPV/SO 9/19/13 (DDK) right eye   S/p 7/15, 8/16 Pars plana vitrectomy (PPV) and SBP with FGx (Max Alcon) OD  S/p pentrating keratoplasty (PKP) 3/26/13 (noé)  S/p laser in situ keratomileusis (LASIK) both eyes 1994  S/p Radial keratotomy (RK) both eyes 1994  S/p Cataract extraction intraocular lens both eyes 2003, 2004  Sp Retinal detachment  Repair left eye 1994     Gtts:  Latanoprost at bedtime both eyes  Pred BID OD  Restasis once daily right eye   Systane nighttime at bedtime both eyes        ASSESSMENT & PLAN  1. S/p Pentrating keratoplasty (PKP) right eye (6/2015)                        - s/p penetrating keratoplasty (PKP)/IOL exchange 6/2015                        -no loose sutures today                            -significant PEE right eye - increase restasis to BID right eye, aggressive lubrication, can use ointment at bedtime     - plugs didn't help in the past    - continue pred BID right eye      2.  H/o RD OD (multiple)                        - S/p Pars  plana vitrectomy (PPV)/SO removal/FAx/STS  right eye 1/25/15                        - follows with Dr. MARKS    - doing well last week    - reviewed RT/RD precautions     3.  Cystoid macular edema left eye                        - likeley chronic                         -follows with Dr. MARKS, continue drops per retina- stopped last visit     - monitor     5.   s/p Retinal detachment repair left eye, 1994                        -no breaks/tears    - reviewed RT/RD precautions                        -observe     6.  Ocular hypertension both eyes                        - IOP wnl right eye, slightly higher left eye today (26 vs. 21 last visit)    - continue latanprost at bedtime OU     7.  Pseudophakia - both eyes     -s/p IOL exchange with glued IOL 6/2015    - stable each eye    - monitor       Kyara Miles MD  PGY-5, Cornea Fellow  Ophthalmology      ~~~~~~~~~~~~~~~~~~~~~~~~~~~~~~~~~~~~~~~~~~~~~~~~~~~~~~~~~~~~~~~~    Complete documentation of historical and exam elements from today's encounter can be found in the full encounter summary report (not reduplicated in this progress note). I personally obtained the chief complaint(s) and history of present illness.  I confirmed and edited as necessary the review of systems, past medical/surgical history, family history, social history, and examination findings as documented by others.  I examined the patient myself, and I personally reviewed the relevant tests, images, and reports as documented above. I formulated and edited as necessary the assessment and plan and discussed the findings and management plan with the patient and family.     I personally interpreted the diagnostic / imaging study and have edited the interpretation as needed.    Sj Petersen MD, MA  Director, Cornea & Anterior Segment  [facility] Department of Ophthalmology & Visual Neuroscience

## 2018-10-15 ENCOUNTER — TELEPHONE (OUTPATIENT)
Dept: OPHTHALMOLOGY | Facility: CLINIC | Age: 63
End: 2018-10-15

## 2018-10-15 NOTE — TELEPHONE ENCOUNTER
[facility] Call Center    Phone Message    May a detailed message be left on voicemail: yes    Reason for Call: Other: Pt states that at his last appt with Dr Petersen, Trinity mentioned that Pt should talk with Dr Palafox about lifting both of Pt's upper eye lid. Pt would like to discuss with someone about this.     Action Taken: Message routed to:  [facility] ([facility]

## 2018-10-16 NOTE — TELEPHONE ENCOUNTER
Dr. Rodarte discussed with Yvonne and she will call patient back tomorrow.  Jocelyne Schwartz RN 5:10 PM 10/16/18

## 2018-10-17 NOTE — TELEPHONE ENCOUNTER
Patient has decided to only proceed with left eye at this time.  He will talk to Dr. Rodarte about the right eye during his Post-Op visit in December.

## 2018-10-19 ENCOUNTER — ANESTHESIA EVENT (OUTPATIENT)
Dept: SURGERY | Facility: CLINIC | Age: 63
End: 2018-10-19
Payer: COMMERCIAL

## 2018-10-19 ENCOUNTER — HOSPITAL ENCOUNTER (OUTPATIENT)
Facility: CLINIC | Age: 63
Discharge: HOME | End: 2018-10-19
Attending: OPHTHALMOLOGY | Admitting: OPHTHALMOLOGY
Payer: COMMERCIAL

## 2018-10-19 ENCOUNTER — SURGERY (OUTPATIENT)
Age: 63
End: 2018-10-19

## 2018-10-19 ENCOUNTER — ANESTHESIA (OUTPATIENT)
Dept: SURGERY | Facility: CLINIC | Age: 63
End: 2018-10-19
Payer: COMMERCIAL

## 2018-10-19 VITALS
OXYGEN SATURATION: 98 % | DIASTOLIC BLOOD PRESSURE: 76 MMHG | WEIGHT: 234.6 LBS | HEART RATE: 65 BPM | TEMPERATURE: 97.2 F | BODY MASS INDEX: 33.58 KG/M2 | HEIGHT: 70 IN | RESPIRATION RATE: 12 BRPM | SYSTOLIC BLOOD PRESSURE: 135 MMHG

## 2018-10-19 DIAGNOSIS — Z98.890 POSTOPERATIVE EYE STATE: Primary | ICD-10-CM

## 2018-10-19 LAB
GLUCOSE BLDC GLUCOMTR-MCNC: 107 MG/DL (ref 70–99)
GLUCOSE BLDC GLUCOMTR-MCNC: 114 MG/DL (ref 70–99)

## 2018-10-19 PROCEDURE — 71000012 ZZH RECOVERY PHASE 1 LEVEL 1 FIRST HR: Performed by: OPHTHALMOLOGY

## 2018-10-19 PROCEDURE — 25000128 H RX IP 250 OP 636: Performed by: NURSE ANESTHETIST, CERTIFIED REGISTERED

## 2018-10-19 PROCEDURE — 40000170 ZZH STATISTIC PRE-PROCEDURE ASSESSMENT II: Performed by: OPHTHALMOLOGY

## 2018-10-19 PROCEDURE — 36000056 ZZH SURGERY LEVEL 3 1ST 30 MIN: Performed by: OPHTHALMOLOGY

## 2018-10-19 PROCEDURE — 82962 GLUCOSE BLOOD TEST: CPT

## 2018-10-19 PROCEDURE — 25000125 ZZHC RX 250: Performed by: NURSE ANESTHETIST, CERTIFIED REGISTERED

## 2018-10-19 PROCEDURE — 27210794 ZZH OR GENERAL SUPPLY STERILE: Performed by: OPHTHALMOLOGY

## 2018-10-19 PROCEDURE — 37000008 ZZH ANESTHESIA TECHNICAL FEE, 1ST 30 MIN: Performed by: OPHTHALMOLOGY

## 2018-10-19 PROCEDURE — 25000128 H RX IP 250 OP 636: Performed by: OPHTHALMOLOGY

## 2018-10-19 PROCEDURE — 71000027 ZZH RECOVERY PHASE 2 EACH 15 MINS: Performed by: OPHTHALMOLOGY

## 2018-10-19 PROCEDURE — 25000125 ZZHC RX 250: Performed by: OPHTHALMOLOGY

## 2018-10-19 RX ORDER — ALBUTEROL SULFATE 0.83 MG/ML
2.5 SOLUTION RESPIRATORY (INHALATION) EVERY 4 HOURS PRN
Status: DISCONTINUED | OUTPATIENT
Start: 2018-10-19 | End: 2018-10-19 | Stop reason: HOSPADM

## 2018-10-19 RX ORDER — ONDANSETRON 2 MG/ML
4 INJECTION INTRAMUSCULAR; INTRAVENOUS EVERY 30 MIN PRN
Status: DISCONTINUED | OUTPATIENT
Start: 2018-10-19 | End: 2018-10-19 | Stop reason: HOSPADM

## 2018-10-19 RX ORDER — TETRACAINE HYDROCHLORIDE 5 MG/ML
SOLUTION OPHTHALMIC PRN
Status: DISCONTINUED | OUTPATIENT
Start: 2018-10-19 | End: 2018-10-19 | Stop reason: HOSPADM

## 2018-10-19 RX ORDER — NALOXONE HYDROCHLORIDE 0.4 MG/ML
.1-.4 INJECTION, SOLUTION INTRAMUSCULAR; INTRAVENOUS; SUBCUTANEOUS
Status: DISCONTINUED | OUTPATIENT
Start: 2018-10-19 | End: 2018-10-19 | Stop reason: HOSPADM

## 2018-10-19 RX ORDER — FENTANYL CITRATE 50 UG/ML
25-50 INJECTION, SOLUTION INTRAMUSCULAR; INTRAVENOUS
Status: DISCONTINUED | OUTPATIENT
Start: 2018-10-19 | End: 2018-10-19 | Stop reason: HOSPADM

## 2018-10-19 RX ORDER — SODIUM CHLORIDE, SODIUM LACTATE, POTASSIUM CHLORIDE, CALCIUM CHLORIDE 600; 310; 30; 20 MG/100ML; MG/100ML; MG/100ML; MG/100ML
INJECTION, SOLUTION INTRAVENOUS CONTINUOUS
Status: DISCONTINUED | OUTPATIENT
Start: 2018-10-19 | End: 2018-10-19 | Stop reason: HOSPADM

## 2018-10-19 RX ORDER — HYDRALAZINE HYDROCHLORIDE 20 MG/ML
2.5-5 INJECTION INTRAMUSCULAR; INTRAVENOUS EVERY 10 MIN PRN
Status: DISCONTINUED | OUTPATIENT
Start: 2018-10-19 | End: 2018-10-19 | Stop reason: HOSPADM

## 2018-10-19 RX ORDER — SODIUM CHLORIDE, SODIUM LACTATE, POTASSIUM CHLORIDE, CALCIUM CHLORIDE 600; 310; 30; 20 MG/100ML; MG/100ML; MG/100ML; MG/100ML
INJECTION, SOLUTION INTRAVENOUS CONTINUOUS PRN
Status: DISCONTINUED | OUTPATIENT
Start: 2018-10-19 | End: 2018-10-19

## 2018-10-19 RX ORDER — LIDOCAINE HYDROCHLORIDE 20 MG/ML
INJECTION, SOLUTION INFILTRATION; PERINEURAL PRN
Status: DISCONTINUED | OUTPATIENT
Start: 2018-10-19 | End: 2018-10-19

## 2018-10-19 RX ORDER — HYDROMORPHONE HYDROCHLORIDE 1 MG/ML
.3-.5 INJECTION, SOLUTION INTRAMUSCULAR; INTRAVENOUS; SUBCUTANEOUS EVERY 10 MIN PRN
Status: DISCONTINUED | OUTPATIENT
Start: 2018-10-19 | End: 2018-10-19 | Stop reason: HOSPADM

## 2018-10-19 RX ORDER — FENTANYL CITRATE 50 UG/ML
INJECTION, SOLUTION INTRAMUSCULAR; INTRAVENOUS PRN
Status: DISCONTINUED | OUTPATIENT
Start: 2018-10-19 | End: 2018-10-19

## 2018-10-19 RX ORDER — PROPOFOL 10 MG/ML
INJECTION, EMULSION INTRAVENOUS PRN
Status: DISCONTINUED | OUTPATIENT
Start: 2018-10-19 | End: 2018-10-19

## 2018-10-19 RX ORDER — MEPERIDINE HYDROCHLORIDE 25 MG/ML
12.5 INJECTION INTRAMUSCULAR; INTRAVENOUS; SUBCUTANEOUS
Status: DISCONTINUED | OUTPATIENT
Start: 2018-10-19 | End: 2018-10-19 | Stop reason: HOSPADM

## 2018-10-19 RX ORDER — NEOMYCIN SULFATE, POLYMYXIN B SULFATE AND DEXAMETHASONE 3.5; 10000; 1 MG/ML; [USP'U]/ML; MG/ML
1 SUSPENSION/ DROPS OPHTHALMIC 3 TIMES DAILY
Qty: 1 BOTTLE | Refills: 0 | Status: SHIPPED | OUTPATIENT
Start: 2018-10-19

## 2018-10-19 RX ORDER — ONDANSETRON 4 MG/1
4 TABLET, ORALLY DISINTEGRATING ORAL EVERY 30 MIN PRN
Status: DISCONTINUED | OUTPATIENT
Start: 2018-10-19 | End: 2018-10-19 | Stop reason: HOSPADM

## 2018-10-19 RX ORDER — ERYTHROMYCIN 5 MG/G
OINTMENT OPHTHALMIC PRN
Status: DISCONTINUED | OUTPATIENT
Start: 2018-10-19 | End: 2018-10-19 | Stop reason: HOSPADM

## 2018-10-19 RX ADMIN — LIDOCAINE HYDROCHLORIDE 40 MG: 20 INJECTION, SOLUTION INFILTRATION; PERINEURAL at 09:51

## 2018-10-19 RX ADMIN — MIDAZOLAM 0.5 MG: 1 INJECTION INTRAMUSCULAR; INTRAVENOUS at 09:50

## 2018-10-19 RX ADMIN — DEXMEDETOMIDINE HYDROCHLORIDE 4 MCG: 100 INJECTION, SOLUTION INTRAVENOUS at 09:49

## 2018-10-19 RX ADMIN — SODIUM CHLORIDE, POTASSIUM CHLORIDE, SODIUM LACTATE AND CALCIUM CHLORIDE: 600; 310; 30; 20 INJECTION, SOLUTION INTRAVENOUS at 09:48

## 2018-10-19 RX ADMIN — LIDOCAINE HYDROCHLORIDE,EPINEPHRINE BITARTRATE 3 ML: 20; .01 INJECTION, SOLUTION INFILTRATION; PERINEURAL at 09:50

## 2018-10-19 RX ADMIN — PROPOFOL 10 MG: 10 INJECTION, EMULSION INTRAVENOUS at 09:52

## 2018-10-19 RX ADMIN — DEXMEDETOMIDINE HYDROCHLORIDE 4 MCG: 100 INJECTION, SOLUTION INTRAVENOUS at 09:51

## 2018-10-19 RX ADMIN — FENTANYL CITRATE 25 MCG: 50 INJECTION, SOLUTION INTRAMUSCULAR; INTRAVENOUS at 09:50

## 2018-10-19 RX ADMIN — TETRACAINE HYDROCHLORIDE 2 DROP: 5 SOLUTION OPHTHALMIC at 09:46

## 2018-10-19 RX ADMIN — ERYTHROMYCIN 2 G: 5 OINTMENT OPHTHALMIC at 09:46

## 2018-10-19 RX ADMIN — PROPOFOL 40 MG: 10 INJECTION, EMULSION INTRAVENOUS at 09:51

## 2018-10-19 RX ADMIN — DEXMEDETOMIDINE HYDROCHLORIDE 4 MCG: 100 INJECTION, SOLUTION INTRAVENOUS at 09:50

## 2018-10-19 ASSESSMENT — ENCOUNTER SYMPTOMS
DYSRHYTHMIAS: 0
SEIZURES: 0

## 2018-10-19 ASSESSMENT — COPD QUESTIONNAIRES: COPD: 0

## 2018-10-19 ASSESSMENT — LIFESTYLE VARIABLES: TOBACCO_USE: 0

## 2018-10-19 NOTE — ANESTHESIA CARE TRANSFER NOTE
Patient: Lex Oates    Procedure(s):  LEFT UPPER EYELID PTOSIS REPAIR     Diagnosis: ENVOLUTIONAL PTOSIS AQUIRED LEFT EYE   Diagnosis Additional Information: No value filed.    Anesthesia Type:   MAC     Note:  Airway :Room Air  Patient transferred to:PACU  Comments: 1009:  To par awake.Handoff Report: Identifed the Patient, Identified the Reponsible Provider, Reviewed the pertinent medical history, Discussed the surgical course, Reviewed Intra-OP anesthesia mangement and issues during anesthesia, Set expectations for post-procedure period and Allowed opportunity for questions and acknowledgement of understanding      Vitals: (Last set prior to Anesthesia Care Transfer)    CRNA VITALS  10/19/2018 0939 - 10/19/2018 1009      10/19/2018             Resp Rate (set): 10                Electronically Signed By: CONSTANCE Smith CRNA  October 19, 2018  10:09 AM

## 2018-10-19 NOTE — IP AVS SNAPSHOT
[facility] Same Day [facility]    [address]    [place] [address]    Phone:  [phone]    Fax:  [phone]                                       After Visit Summary   10/19/2018    Lex Oates    MRN: [id number]           After Visit Summary Signature Page     I have received my discharge instructions, and my questions have been answered. I have discussed any challenges I see with this plan with the nurse or doctor.    ..........................................................................................................................................  Patient/Patient Representative Signature      ..........................................................................................................................................  Patient Representative Print Name and Relationship to Patient    ..................................................               ................................................  Date                                   Time    ..........................................................................................................................................  Reviewed by Signature/Title    ...................................................              ..............................................  Date                                               Time          22EPIC Rev 08/18

## 2018-10-19 NOTE — ANESTHESIA POSTPROCEDURE EVALUATION
Patient: Lex Oates    Procedure(s):  LEFT UPPER EYELID PTOSIS REPAIR     Diagnosis:ENVOLUTIONAL PTOSIS AQUIRED LEFT EYE   Diagnosis Additional Information: No value filed.    Anesthesia Type:  MAC    Note:  Anesthesia Post Evaluation    Patient location during evaluation: Phase 2  Patient participation: Able to fully participate in evaluation  Level of consciousness: awake and alert  Pain management: adequate  Airway patency: patent  Cardiovascular status: acceptable  Respiratory status: acceptable  Hydration status: acceptable  PONV: none     Anesthetic complications: None          Last vitals:  Vitals:    10/19/18 1030 10/19/18 1045 10/19/18 1130   BP: 143/72 124/70 135/76   Pulse:      Resp: 14 13 12   Temp:  36.2  C (97.2  F)    SpO2: 96% 96% 98%         Electronically Signed By: Erum Roman MD  October 19, 2018  3:02 PM

## 2018-10-19 NOTE — BRIEF OP NOTE
[facility]    Brief Operative Note    Pre-operative diagnosis: ENVOLUTIONAL PTOSIS AQUIRED LEFT EYE   Post-operative diagnosis * No post-op diagnosis entered *  Procedure: Procedure(s):  LEFT UPPER EYELID PTOSIS REPAIR   Surgeon: Surgeon(s) and Role:     * Javy Rodarte MD - Primary  Anesthesia: Monitor Anesthesia Care   Estimated blood loss: Minimal  Drains: None  Specimens: * No specimens in log *  Findings:   None.  Complications: None.  Implants: None.

## 2018-10-19 NOTE — DISCHARGE INSTRUCTIONS
Same Day Surgery Discharge Instructions for  Sedation and General Anesthesia       It's not unusual to feel dizzy, light-headed or faint for up to 24 hours after surgery or while taking pain medication.  If you have these symptoms: sit for a few minutes before standing and have someone assist you when you get up to walk or use the bathroom.      You should rest and relax for the next 24 hours. We recommend you make arrangements to have an adult stay with you for at least 24 hours after your discharge.  Avoid hazardous and strenuous activity.      DO NOT DRIVE any vehicle or operate mechanical equipment for 24 hours following the end of your surgery.  Even though you may feel normal, your reactions may be affected by the medication you have received.      Do not drink alcoholic beverages for 24 hours following surgery.       Slowly progress to your regular diet as you feel able. It's not unusual to feel nauseated and/or vomit after receiving anesthesia.  If you develop these symptoms, drink clear liquids (apple juice, ginger ale, broth, 7-up, etc. ) until you feel better.  If your nausea and vomiting persists for 24 hours, please notify your surgeon.        All narcotic pain medications, along with inactivity and anesthesia, can cause constipation. Drinking plenty of liquids and increasing fiber intake will help.      For any questions of a medical nature, call your surgeon.      Do not make important decisions for 24 hours.      If you had general anesthesia, you may have a sore throat for a couple of days related to the breathing tube used during surgery.  You may use Cepacol lozenges to help with this discomfort.  If it worsens or if you develop a fever, contact your surgeon.       If you feel your pain is not well managed with the pain medications prescribed by your surgeon, please contact your surgeon's office to let them know so they can address your concerns.               Post-operative  Instructions  Ophthalmic Plastic and Reconstructive Surgery    Javy Rodarte M.D.     All instructions apply to the operated eye(s) or eyelid(s).    Wound care and personal care  ? If a patch or bandage has been placed, please leave this in place until seen by your physician. Ensure that the bandage does not get wet when you take a shower.  ? Apply ice compresses 15 minutes on 15 minutes off while awake for 2 days, then switch to warm water compresses 4 times a day until seen by your physician. For warm packs you can place a cup of dry uncooked rice in a clean cotton sock. Then place sock in microwave 30 seconds to one minute. Next place the warm sock into a plastic bag and wrap the bag with clean warm wet washcloth and place over operated eye.    ? You may shower or wash your hair the day after surgery. Do not bathe or go swimming for 1 week to prevent contamination of your wounds.  ? Do not apply make-up to the eyes or eyelids for 2 weeks after surgery.  ? Expect some swelling, bruising, black eye (even into the lower eyelids and cheeks). Also expect serum caking, crusting and discharge from the eye and/or incisions. A small amount of surface bleeding is normal for the first 48 hours.  ? Your eye(s) and eyelid(s) may be painful and tender. This is normal after surgery.      Contact information and follow-up  ? Return to the Eye Clinic for a follow-up appointment with your physician as  scheduled. If no appointment has been scheduled:   - [facility] eye clinic: [phone] for an appointment with Dr. Rodarte within 1 to 2 weeks from your date of surgery.   -  [facility] eye clinic: [phone] for an appointment with Dr. Rodarte within 1 to 2 weeks from your date of surgery.     ? For severe pain, bleeding, or loss of vision, call the [facility] at [phone] or [facility] at [phone].     After hours or on weekends and holidays,  call [phone] and ask to speak with the ophthalmologist on call.    An on call person can be reached after hours for concerns. The on call doctor should not call in medication refill requests after hours or on weekends, so please plan accordingly. An effort has been made to provide adequate pain medications following every surgery, and refills will not be provided in most instances. Narcotic pain medications cannot be called in.     Activity restrictions and driving  ? Avoid heavy lifting, bending, exercise or strenuous activity for 1 week after surgery.  You may resume other activities and return to work as tolerated.  ? You may not resume driving until have you stopped using narcotic pain medications (such as Norco, Percocet, Tylenol #3).    Medications  ? Restart all your regular home medications and eye drops. If you take Plavix or  Aspirin on a regular basis, wait for 72 hours after your surgery before restarting these in order to decrease the risk of bleeding complications.  ? Avoid aspirin and aspirin-like medications (Motrin, Aleve, Ibuprofen, Rebecca-  [place] etc) for 72 hours to reduce the risk of bleeding. You may take Tylenol  (acetaminophen) for pain.  ? In addition to your home medications, take the following post-operative medications as prescribed by your physician.    ? Instill eye drops 3 times a day for 10 days.     ? WARNING:  You must not take more than 4,000 mg of acetaminophen per  24-hour period.             **If you have questions or concerns about your procedure,   call Dr. Rodarte at [phone]**

## 2018-10-19 NOTE — IP AVS SNAPSHOT
MRN:[id number]                      After Visit Summary   10/19/2018    Lex Oates    MRN: [id number]           Thank you!     Thank you for choosing [facility] for your care. Our goal is always to provide you with excellent care. Hearing back from our patients is one way we can continue to improve our services. Please take a few minutes to complete the written survey that you may receive in the mail after you visit with us. Thank you!        Patient Information     Date Of Birth          1955        About your hospital stay     You were admitted on:  October 19, 2018 [facility]    You were discharged on:  October 19, 2018       Who to Call     For medical emergencies, please call 911.  For non-urgent questions about your medical care, please call your primary care provider or clinic, [phone]  For questions related to your surgery, please call your surgery clinic        Attending Provider     Provider Specialty    Javy Rodarte MD Ophthalmology       Primary Care Provider Office Phone # Fax #    Torin Robles [phone]      After Care Instructions     Discharge Medication Instructions       Do NOT take aspirin or medications containing NSAIDS for 72 hours after procedure.            Ice to affected area       Apply cold pack for 15 minutes on, 15 minutes off, for 48 hours while awake.                  Your next 10 appointments already scheduled     Dec 18, 2018  9:15 AM CST   (Arrive by 9:00 AM)   Post-Op with Javy Rodarte MD   [facility] ([facility])    [address]  [address] [facility] [address] [phone]            Mar 26, 2019  9:15 AM CDT   RETURN CORNEA with Sj Petersen MD   Eye Clinic ([facility])    [address]  [facility] [address] [phone]            Mar 26, 2019  10:10 AM CDT   RETURN RETINA with Yolette Perez MD   Eye Clinic ([facility])    [address]  [address]  [facility] [address] [phone]              Further instructions from your care team       Same Day Surgery Discharge Instructions for  Sedation and General Anesthesia       It's not unusual to feel dizzy, light-headed or faint for up to 24 hours after surgery or while taking pain medication.  If you have these symptoms: sit for a few minutes before standing and have someone assist you when you get up to walk or use the bathroom.      You should rest and relax for the next 24 hours. We recommend you make arrangements to have an adult stay with you for at least 24 hours after your discharge.  Avoid hazardous and strenuous activity.      DO NOT DRIVE any vehicle or operate mechanical equipment for 24 hours following the end of your surgery.  Even though you may feel normal, your reactions may be affected by the medication you have received.      Do not drink alcoholic beverages for 24 hours following surgery.       Slowly progress to your regular diet as you feel able. It's not unusual to feel nauseated and/or vomit after receiving anesthesia.  If you develop these symptoms, drink clear liquids (apple juice, ginger ale, broth, 7-up, etc. ) until you feel better.  If your nausea and vomiting persists for 24 hours, please notify your surgeon.        All narcotic pain medications, along with inactivity and anesthesia, can cause constipation. Drinking plenty of liquids and increasing fiber intake will help.      For any questions of a medical nature, call your surgeon.      Do not make important decisions for 24 hours.      If you had general anesthesia, you may have a sore throat for a couple of days related to the breathing tube used during surgery.  You may use Cepacol lozenges to help with this discomfort.  If it worsens or if you develop a fever,  contact your surgeon.       If you feel your pain is not well managed with the pain medications prescribed by your surgeon, please contact your surgeon's office to let them know so they can address your concerns.               Post-operative Instructions  Ophthalmic Plastic and Reconstructive Surgery    Javy Rodarte M.D.     All instructions apply to the operated eye(s) or eyelid(s).    Wound care and personal care  ? If a patch or bandage has been placed, please leave this in place until seen by your physician. Ensure that the bandage does not get wet when you take a shower.  ? Apply ice compresses 15 minutes on 15 minutes off while awake for 2 days, then switch to warm water compresses 4 times a day until seen by your physician. For warm packs you can place a cup of dry uncooked rice in a clean cotton sock. Then place sock in microwave 30 seconds to one minute. Next place the warm sock into a plastic bag and wrap the bag with clean warm wet washcloth and place over operated eye.    ? You may shower or wash your hair the day after surgery. Do not bathe or go swimming for 1 week to prevent contamination of your wounds.  ? Do not apply make-up to the eyes or eyelids for 2 weeks after surgery.  ? Expect some swelling, bruising, black eye (even into the lower eyelids and cheeks). Also expect serum caking, crusting and discharge from the eye and/or incisions. A small amount of surface bleeding is normal for the first 48 hours.  ? Your eye(s) and eyelid(s) may be painful and tender. This is normal after surgery.      Contact information and follow-up  ? Return to the Eye Clinic for a follow-up appointment with your physician as  scheduled. If no appointment has been scheduled:   - [facility] eye clinic: [phone] for an appointment with Dr. Rodarte within 1 to 2 weeks from your date of surgery.   -  [facility] eye clinic: [phone] for an appointment with Dr. Rodarte within 1 to  2 weeks from your date of surgery.     ? For severe pain, bleeding, or loss of vision, call the [facility] at [phone] or [facility] at [phone].     After hours or on weekends and holidays, call [phone] and ask to speak with the ophthalmologist on call.    An on call person can be reached after hours for concerns. The on call doctor should not call in medication refill requests after hours or on weekends, so please plan accordingly. An effort has been made to provide adequate pain medications following every surgery, and refills will not be provided in most instances. Narcotic pain medications cannot be called in.     Activity restrictions and driving  ? Avoid heavy lifting, bending, exercise or strenuous activity for 1 week after surgery.  You may resume other activities and return to work as tolerated.  ? You may not resume driving until have you stopped using narcotic pain medications (such as Norco, Percocet, Tylenol #3).    Medications  ? Restart all your regular home medications and eye drops. If you take Plavix or  Aspirin on a regular basis, wait for 72 hours after your surgery before restarting these in order to decrease the risk of bleeding complications.  ? Avoid aspirin and aspirin-like medications (Motrin, Aleve, Ibuprofen, Rebecca-  Randolph etc) for 72 hours to reduce the risk of bleeding. You may take Tylenol  (acetaminophen) for pain.  ? In addition to your home medications, take the following post-operative medications as prescribed by your physician.    ? Instill eye drops 3 times a day for 10 days.     ? WARNING:  You must not take more than 4,000 mg of acetaminophen per  24-hour period.             **If you have questions or concerns about your procedure,   call Dr. Rodarte at [phone]**      Pending Results     No orders found from 10/17/2018 to 10/20/2018.            Admission Information     Date & Time Provider Department Dept. Phone  "   10/19/2018 Javy Rodarte MD [facility] Same Day Surgery [phone]      Your Vitals Were     Blood Pressure Pulse Temperature Respirations Height Weight    143/72 65 97.7  F (36.5  C) (Temporal) 14 1.778 m (5' 10\") 106.4 kg (234 lb 9.6 oz)    Pulse Oximetry BMI (Body Mass Index)                96% 33.66 kg/m2          [email or web address] Information     [employer] gives you secure access to your electronic health record. If you see a primary care provider, you can also send messages to your care team and make appointments. If you have questions, please call your primary care clinic.  If you do not have a primary care provider, please call [phone] and they will assist you.        Care EveryWhere ID     This is your Care EveryWhere ID. This could be used by other organizations to access your [facility] medical records  [id number]        Equal Access to Services     LEXUS ALFREDO : Chelsey Pearl, tanya vicente, kenneth keller, ki rodriguez. So [facility] [phone].    ATENCIÓN: Si habla español, tiene a giraldo disposición servicios gratuitos de asistencia lingüística. Llame al [phone].    We comply with applicable federal civil rights laws and [place] laws. We do not discriminate on the basis of race, color, national origin, age, disability, sex, sexual orientation, or gender identity.               Review of your medicines      START taking        Dose / Directions    neomycin-polymyxin-dexamethasone 3.5-04657-3.1 Susp ophthalmic susp   Commonly known as:  MAXITROL   Used for:  Postoperative eye state        Dose:  1 drop   Place 1 drop Into the left eye 3 times daily   Quantity:  1 Bottle   Refills:  0         CONTINUE these medicines which have NOT CHANGED        Dose / Directions    ASPIRIN PO        Dose:  325 mg   Take 325 mg by mouth daily   Refills:  0       ATIVAN PO        Dose:  0.5 mg   Take 0.5 mg by mouth 3 times daily as needed for anxiety "   Refills:  0       cycloSPORINE 0.05 % ophthalmic emulsion   Commonly known as:  RESTASIS        Dose:  1 drop   Place 1 drop into the right eye as needed   Refills:  0       GENTEAL TEARS NIGHT-TIME Oint ophthalmic ointment        Apply to eye At Bedtime   Refills:  0       GLIPIZIDE PO        Dose:  10 mg   Take 10 mg by mouth every morning (before breakfast)   Refills:  0       glyBURIDE 1.25 MG tablet   Commonly known as:  DIABETA /MICRONASE        Dose:  2 tablet   Take 2 tablets by mouth 2 times daily   Refills:  0       LANTUS VIAL 100 UNIT/ML injection   Generic drug:  insulin glargine        Dose:  70 Units   Inject 70 Units Subcutaneous At Bedtime   Refills:  0       latanoprost 0.005 % ophthalmic solution   Commonly known as:  XALATAN   Used for:  Primary open angle glaucoma of both eyes, mild stage        Dose:  1 drop   Place 1 drop into both eyes At Bedtime   Quantity:  7.5 mL   Refills:  11       LIPITOR 10 MG tablet   Generic drug:  atorvastatin        Dose:  10 mg   Take 10 mg by mouth daily   Refills:  0       LOSARTAN POTASSIUM PO        Dose:  100 mg   Take 100 mg by mouth daily   Refills:  0       METOPROLOL TARTRATE PO        Dose:  50 mg   Take 50 mg by mouth 2 times daily   Refills:  0       NORVASC 2.5 MG tablet   Generic drug:  amLODIPine        Dose:  10 mg   Take 10 mg by mouth daily   Refills:  0       prednisoLONE acetate 1 % ophthalmic susp   Commonly known as:  PRED FORTE   Used for:  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        Dose:  1 drop   Place 1 drop into the right eye 2 times daily   Quantity:  5 mL   Refills:  11       PRILOSEC OTC PO        Dose:  20 mg   Take 20 mg by mouth daily   Refills:  0       REFRESH 1.4-0.6 % ophthalmic solution   Generic drug:  polyvinyl alcohol-povidone        Dose:  1-2 drop   Place 1-2 drops into the right eye as needed   Refills:  0       SERTRALINE HCL PO        Dose:  75 mg   Take 75 mg by mouth daily   Refills:  0       TERAZOSIN HCL PO         Dose:  5 mg   Take 5 mg by mouth At Bedtime   Refills:  0       triamcinolone 0.1 % cream   Commonly known as:  KENALOG        2 times daily   Refills:  0         STOP taking     HYDROcodone-acetaminophen 5-325 MG per tablet   Commonly known as:  NORCO                Where to get your medicines      These medications were sent to [facility] - [address]  [address], [place] [address]     Phone:  [phone]     neomycin-polymyxin-dexamethasone 3.5-38981-5.1 Susp ophthalmic susp                Protect others around you: Learn how to safely use, store and throw away your medicines at www.disposemymeds.org.             Medication List: This is a list of all your medications and when to take them. Check marks below indicate your daily home schedule. Keep this list as a reference.      Medications           Morning Afternoon Evening Bedtime As Needed    ASPIRIN PO   Take 325 mg by mouth daily                                ATIVAN PO   Take 0.5 mg by mouth 3 times daily as needed for anxiety                                cycloSPORINE 0.05 % ophthalmic emulsion   Commonly known as:  RESTASIS   Place 1 drop into the right eye as needed                                GENTEAL TEARS NIGHT-TIME Oint ophthalmic ointment   Apply to eye At Bedtime                                GLIPIZIDE PO   Take 10 mg by mouth every morning (before breakfast)                                glyBURIDE 1.25 MG tablet   Commonly known as:  DIABETA /MICRONASE   Take 2 tablets by mouth 2 times daily                                LANTUS VIAL 100 UNIT/ML injection   Inject 70 Units Subcutaneous At Bedtime   Generic drug:  insulin glargine                                latanoprost 0.005 % ophthalmic solution   Commonly known as:  XALATAN   Place 1 drop into both eyes At Bedtime                                LIPITOR 10 MG tablet   Take 10 mg by mouth daily   Generic drug:  atorvastatin                                 LOSARTAN POTASSIUM PO   Take 100 mg by mouth daily                                METOPROLOL TARTRATE PO   Take 50 mg by mouth 2 times daily                                neomycin-polymyxin-dexamethasone 3.5-51008-1.1 Susp ophthalmic susp   Commonly known as:  MAXITROL   Place 1 drop Into the left eye 3 times daily                                NORVASC 2.5 MG tablet   Take 10 mg by mouth daily   Generic drug:  amLODIPine                                prednisoLONE acetate 1 % ophthalmic susp   Commonly known as:  PRED FORTE   Place 1 drop into the right eye 2 times daily                                PRILOSEC OTC PO   Take 20 mg by mouth daily                                REFRESH 1.4-0.6 % ophthalmic solution   Place 1-2 drops into the right eye as needed   Generic drug:  polyvinyl alcohol-povidone                                SERTRALINE HCL PO   Take 75 mg by mouth daily                                TERAZOSIN HCL PO   Take 5 mg by mouth At Bedtime                                triamcinolone 0.1 % cream   Commonly known as:  KENALOG   2 times daily

## 2018-10-19 NOTE — ANESTHESIA PREPROCEDURE EVALUATION
Procedure: Procedure(s):  LEFT UPPER EYELID PTOSIS REPAIR   Preop diagnosis: ENVOLUTIONAL PTOSIS AQUIRED LEFT EYE     Allergies   Allergen Reactions     Codeine Nausea and Vomiting     Metformin Diarrhea     Tylenol W/Codeine [Acetaminophen-Codeine] Nausea and Vomiting     Past Medical History:   Diagnosis Date     Coronary artery disease      Diabetes mellitus (H)      Erectile dysfunction      Gastro-oesophageal reflux disease      Generalized anxiety disorder      Glaucoma      Gout      Hyperlipidaemia      Hypertension      Renal disease     CKD     Stented coronary artery      Past Surgical History:   Procedure Laterality Date     C CORNEAL TRANSPLANT,PENETRATING      RE     C RADIAL KERATOTOMY       CARDIAC SURGERY      CAB     CATARACT IOL, RT/LT       corneal transplant surgery       EYE SURGERY Right 6/17/2015    IOL exchange combined with PK of RE     GENITOURINARY SURGERY      orchiectomy     HC YAG LASER CAPSULOTOMY      BE     KERATOPLASTY PENETRATING Right 6/17/2015     LASIK BILATERAL       ORTHOPEDIC SURGERY      shouder repair     REPAIR ECTROPION Right 10/30/2015    Procedure: REPAIR ECTROPION;  Surgeon: Javy Rodarte MD;  Location:  OR     REPAIR PTOSIS Right 10/30/2015    Procedure: REPAIR PTOSIS;  Surgeon: Javy Rodarte MD;  Location:  OR     RETINAL DETACHMENT REPAIR      RE x3  LE x 1     VITRECTOMY PARSPLANA WITH 23 GAUGE SYSTEM  2/25/2014    Procedure: VITRECTOMY PARSPLANA WITH 23 GAUGE SYSTEM;  Right Eye 23 Gauge Pars Plana Vitrectomy With Silicone Oil Removal Air/Fluid Exchange, Infusion of C3F8 17%;  Surgeon: Yolette Perez MD;  Location:  OR     VITRECTOMY PARSPLANA WITH 23 GAUGE SYSTEM Right 1/26/2015    Procedure: VITRECTOMY PARSPLANA WITH 23 GAUGE SYSTEM;  Surgeon: Yolette Perez MD;  Location: [facility]     Prior to Admission medications    Medication Sig Start Date End Date Taking? Authorizing Provider   GLIPIZIDE PO Take 10 mg by mouth every morning  (before breakfast)   Yes Reported, Patient   METOPROLOL TARTRATE PO Take 50 mg by mouth 2 times daily   Yes Reported, Patient   neomycin-polymyxin-dexamethasone (MAXITROL) 3.5-84024-1.1 SUSP ophthalmic susp Place 1 drop Into the left eye 3 times daily 10/19/18  Yes Anabell Villa MD   SERTRALINE HCL PO Take 75 mg by mouth daily   Yes Reported, Patient   TERAZOSIN HCL PO Take 5 mg by mouth At Bedtime   Yes Reported, Patient   amLODIPine (NORVASC) 2.5 MG tablet Take 10 mg by mouth daily     Reported, Patient   ASPIRIN PO Take 325 mg by mouth daily     Reported, Patient   atorvastatin (LIPITOR) 10 MG tablet Take 10 mg by mouth daily    Reported, Patient   cycloSPORINE (RESTASIS) 0.05 % ophthalmic emulsion Place 1 drop into the right eye as needed    Reported, Patient   glyBURIDE (DIABETA / MICRONASE) 1.25 MG tablet Take 2 tablets by mouth 2 times daily    Reported, Patient   HYDROcodone-acetaminophen (NORCO) 5-325 MG per tablet Take 1 tablet by mouth every 6 hours as needed for pain Maximum of 4000 mg of acetaminophen in 24 hours. 10/30/15   Carlos Pierre MD   insulin glargine (LANTUS VIAL) SOLN 100 UNIT/ML Inject 70 Units Subcutaneous At Bedtime     Reported, Patient   latanoprost (XALATAN) 0.005 % ophthalmic solution Place 1 drop into both eyes At Bedtime 10/2/18   Sj Petersen MD   LORazepam (ATIVAN PO) Take 0.5 mg by mouth 3 times daily as needed for anxiety    Reported, Patient   LOSARTAN POTASSIUM PO Take 100 mg by mouth daily    Reported, Patient   Omeprazole Magnesium (PRILOSEC OTC PO) Take 20 mg by mouth daily    Reported, Patient   polyvinyl alcohol-povidone (REFRESH) 1.4-0.6 % ophthalmic solution Place 1-2 drops into the right eye as needed    Reported, Patient   prednisoLONE acetate (PRED FORTE) 1 % ophthalmic susp Place 1 drop into the right eye 2 times daily 10/2/18   Sj Petersen MD   triamcinolone (KENALOG) 0.1 % cream 2 times daily    Reported, Patient   White  Petrolatum-Mineral Oil (GENTEAL TEARS NIGHT-TIME) OINT ophthalmic ointment Apply to eye At Bedtime    Reported, Patient     No current Epic-ordered facility-administered medications on file.      Current Outpatient Prescriptions Ordered in Epic   Medication     neomycin-polymyxin-dexamethasone (MAXITROL) 3.5-44683-4.1 SUSP ophthalmic susp     Wt Readings from Last 1 Encounters:   09/25/18 107.5 kg (237 lb)     Temp Readings from Last 1 Encounters:   10/30/15 36.8  C (98.2  F) (Oral)     BP Readings from Last 6 Encounters:   10/30/15 137/72   01/26/15 152/78   02/25/14 164/82     Pulse Readings from Last 4 Encounters:   02/25/14 86     Resp Readings from Last 1 Encounters:   10/30/15 16     SpO2 Readings from Last 1 Encounters:   10/30/15 99%     ECG: NSR, atrial conduction delay    Anesthesia Evaluation     . Pt has had prior anesthetic. Type: MAC and General           ROS/MED HX    ENT/Pulmonary:      (-) tobacco use, asthma, COPD and sleep apnea   Neurologic:      (-) seizures, CVA and TIA   Cardiovascular:     (+) Dyslipidemia, hypertension--CAD, -CABG (2015 - 4 vessels)-stent,. : . . . :. .      (-) COLÓN, arrhythmias and valvular problems/murmurs   METS/Exercise Tolerance:  >4 METS   Hematologic:        (-) history of blood clots, anemia and other hematologic disorder   Musculoskeletal:   (+) , , other musculoskeletal- gout      GI/Hepatic:     (+) GERD      (-) liver disease   Renal/Genitourinary:     (+) chronic renal disease, type: CRI,    (-) nephrolithiasis   Endo:     (+) type II DM .   (-) Type I DM, thyroid disease and obesity   Psychiatric:        (-) psychiatric history   Infectious Disease:        (-) Recent Fever   Malignancy:         Other:    (+) H/O Chronic Pain,                   Physical Exam  Normal systems: cardiovascular, pulmonary and dental    Airway   Mallampati: III  TM distance: >3 FB    Dental     Cardiovascular   Rhythm and rate: regular and normal      Pulmonary    breath sounds clear  to auscultation                    Anesthesia Plan      History & Physical Review      ASA Status:  3 .    NPO Status:  > 8 hours    Plan for MAC Reason for MAC:  Deep or markedly invasive procedure (G8)  PONV prophylaxis:  Ondansetron (or other 5HT-3)       Postoperative Care  Postoperative pain management:  Multi-modal analgesia.      Consents  Anesthetic plan, risks, benefits and alternatives discussed with:  Patient..                          .

## 2018-10-19 NOTE — OP NOTE
PREOPERATIVE DIAGNOSIS: Left upper eyelid ptosis.   POSTOPERATIVE DIAGNOSIS:  Left upper eyelid ptosis.   PROCEDURE:Left upper eyelid ptosis repair by Ferris's muscle conjunctival resection.   SURGEON: Javy Rodarte MD  ASSISTANT: Anabell Villa MD  ANESTHESIA: Monitored with local infiltration of a 50/50 mixture of 2% lidocaine with epinephrine and 0.5% Marcaine.   COMPLICATIONS: None.   ESTIMATED BLOOD LOSS: 1 mL   HISTORY: Lex Oates presented with upper lid ptosis interfering with the superior visual field and activities of daily living. After the risks, benefits and alternatives to the proposed procedure were explained, informed consent was obtained.   PROCEDURE: The patient was brought to the operating room and placed supine on the operating table. IV sedation was given. The left upper eyelid was infiltrated with local anesthetic and  was prepped and draped in the typical sterile ophthalmic fashion. Atttention was directed to the left  side.  A 4-0 silk suture was placed through the eyelid margin and the eyelid everted over a Desmarres retractor. A 6-0 silk suture was then threaded through the conjunctiva and Ferris's muscle 4.5 mm from the superior tarsal border in order to excise a total of 9 mm of muscle and conjunctiva. These sutures were used to elevate the conjunctiva and Ferris's muscle which was gently peeled from the underlying levator muscle. The Putterman clamp was used and clamped over the elevated tissues. A 6-0 plain gut suture was run in a horizontal mattress fashion 1 mm below the clamp from lateral to medial, then medial to lateral. The elevated tissues were excised with a 15 blade. The sutures were then externalized and tied in the lid crease. The 4-0 silk suture was removed. The lid was reverted to its normal position and ophthalmic antibiotic ointment placed in the eye.    The patient tolerated the procedure well and left the operating room in stable condition.   Javy  MD [place]

## 2018-12-17 ENCOUNTER — TELEPHONE (OUTPATIENT)
Dept: OPHTHALMOLOGY | Facility: CLINIC | Age: 63
End: 2018-12-17

## 2019-03-26 ENCOUNTER — OFFICE VISIT (OUTPATIENT)
Dept: OPHTHALMOLOGY | Facility: CLINIC | Age: 64
End: 2019-03-26
Attending: OPHTHALMOLOGY
Payer: COMMERCIAL

## 2019-03-26 DIAGNOSIS — Z94.7 CORNEA REPLACED BY TRANSPLANT: Primary | ICD-10-CM

## 2019-03-26 DIAGNOSIS — Z96.1 PSEUDOPHAKIA OF BOTH EYES: ICD-10-CM

## 2019-03-26 DIAGNOSIS — H35.352 CYSTOID MACULAR EDEMA OF LEFT EYE: Primary | ICD-10-CM

## 2019-03-26 DIAGNOSIS — H35.352 CYSTOID MACULAR EDEMA OF LEFT EYE: ICD-10-CM

## 2019-03-26 PROCEDURE — G0463 HOSPITAL OUTPT CLINIC VISIT: HCPCS | Mod: ZF

## 2019-03-26 PROCEDURE — 92134 CPTRZ OPH DX IMG PST SGM RTA: CPT | Mod: ZF | Performed by: OPHTHALMOLOGY

## 2019-03-26 PROCEDURE — 40000269 ZZH STATISTIC NO CHARGE FACILITY FEE: Mod: ZF

## 2019-03-26 ASSESSMENT — REFRACTION_WEARINGRX
OD_SPHERE: -2.25
OS_AXIS: 043
OD_AXIS: 140
SPECS_TYPE: SVL
OS_CYLINDER: +1.75
OS_CYLINDER: +1.75
OS_AXIS: 043
OD_SPHERE: -2.25
OS_SPHERE: +5.00
OD_CYLINDER: +4.50
OD_AXIS: 140
OD_CYLINDER: +4.50
OS_SPHERE: +5.00
SPECS_TYPE: SVL

## 2019-03-26 ASSESSMENT — CONF VISUAL FIELD
OS_NORMAL: 1
OD_INFERIOR_TEMPORAL_RESTRICTION: 3
OD_INFERIOR_TEMPORAL_RESTRICTION: 3

## 2019-03-26 ASSESSMENT — SLIT LAMP EXAM - LIDS: COMMENTS: NORMAL

## 2019-03-26 ASSESSMENT — VISUAL ACUITY
CORRECTION_TYPE: GLASSES
OD_PH_CC: 20/300
OD_PH_CC: 20/300
OD_CC: 20/400
OS_PH_CC: 20/200
OS_CC: 20/400
CORRECTION_TYPE: GLASSES
OS_PH_CC: 20/200
OS_CC: 20/400
METHOD: SNELLEN - LINEAR
METHOD: SNELLEN - LINEAR
OD_CC: 20/400

## 2019-03-26 ASSESSMENT — TONOMETRY
OD_IOP_MMHG: 11
OS_IOP_MMHG: 13
IOP_METHOD: ICARE
IOP_METHOD: ICARE
OS_IOP_MMHG: 13
OD_IOP_MMHG: 11

## 2019-03-26 ASSESSMENT — CUP TO DISC RATIO
OS_RATIO: 0.1
OD_RATIO: 0.2

## 2019-03-26 ASSESSMENT — EXTERNAL EXAM - RIGHT EYE: OD_EXAM: NORMAL

## 2019-03-26 ASSESSMENT — EXTERNAL EXAM - LEFT EYE: OS_EXAM: NORMAL

## 2019-03-26 NOTE — PROGRESS NOTES
CC - recheck for K transplant     INTERVAL HISTORY -  Doing well since last visit. Saw Retina and plastics last week. CME improved - stopped ketorolac. Noted to have ptosis left eye- planning for surgical repair left eye with Dr. Rodarte in 2 weeks.    Vision stable. No eye pain or redness.      HPI:    60 yo male s/p PPV/MP/AC washout/FAX OD for ERM 12/22/16   Mr. Oates is a 61  year old male s/p silicone oil removal right eye 1/25/2015, prior  PPV/SO replacement for RRD (5/1/14).  Retinal detachment right eye s/p repair x 4, most recent Pars plana vitrectomy (PPV)/SO 5/1/14     Ocular surgery:  PPV/MS/FAx/AC washout OD 12/22/16 for SO in AC and severe ERM  pentrating keratoplasty (PKP) right eye with intraocular lens exchange 6/17/15  Pars plana vitrectomy (PPV)/SO removal /FAx 1/25/2015 right eye   Pars plana vitrectomy (PPV)/SO placement 5/1/2014 right eye   Pars plana vitrectomy (PPV)/SO removal/FGx C3F8 2/25/14 (DDK) right eye   PPV/SO 9/19/13 (DDK) right eye   S/p 7/15, 8/16 Pars plana vitrectomy (PPV) and SBP with FGx (Max Alcon) OD  S/p pentrating keratoplasty (PKP) 3/26/13 (noé)  S/p laser in situ keratomileusis (LASIK) both eyes 1994  S/p Radial keratotomy (RK) both eyes 1994  S/p Cataract extraction intraocular lens both eyes 2003, 2004  Sp Retinal detachment  Repair left eye 1994     Gtts:  Latanoprost at bedtime both eyes  Pred BID OD  Restasis once daily right eye   Systane nighttime at bedtime both eyes        ASSESSMENT & PLAN  1. S/p Pentrating keratoplasty (PKP) right eye (6/2015)                        - s/p penetrating keratoplasty (PKP)/IOL exchange 6/2015                        -no loose sutures today                            -significant PEE right eye - increase restasis to BID right eye, aggressive lubrication, can use ointment at bedtime     - plugs didn't help in the past    - continue pred BID right eye      2.  H/o RD OD (multiple)                        - S/p Pars  plana vitrectomy (PPV)/SO removal/FAx/STS  right eye 1/25/15                        - follows with Dr. MARKS    - doing well last week    - reviewed RT/RD precautions     3.  Cystoid macular edema left eye                        - likeley chronic                         -follows with Dr. MARKS, continue drops per retina- stopped last visit     - monitor     5.   s/p Retinal detachment repair left eye, 1994                        -no breaks/tears    - reviewed RT/RD precautions                        -observe     6.  Ocular hypertension both eyes                        - IOP wnl right eye, slightly higher left eye today (26 vs. 21 last visit)    - continue latanprost at bedtime OU     7.  Pseudophakia - both eyes     -s/p IOL exchange with glued IOL 6/2015    - stable each eye    - monitor       Kyara Miles MD  PGY-5, Cornea Fellow  Ophthalmology      ~~~~~~~~~~~~~~~~~~~~~~~~~~~~~~~~~~~~~~~~~~~~~~~~~~~~~~~~~~~~~~~~    Complete documentation of historical and exam elements from today's encounter can be found in the full encounter summary report (not reduplicated in this progress note). I personally obtained the chief complaint(s) and history of present illness.  I confirmed and edited as necessary the review of systems, past medical/surgical history, family history, social history, and examination findings as documented by others.  I examined the patient myself, and I personally reviewed the relevant tests, images, and reports as documented above. I formulated and edited as necessary the assessment and plan and discussed the findings and management plan with the patient and family.       Sj Petersen MD, MA  Director, Cornea & Anterior Segment  [facility] Department of Ophthalmology & Visual Neuroscience

## 2019-03-26 NOTE — NURSING NOTE
Chief Complaints and History of Present Illnesses   Patient presents with     Follow Up      6 month follow up H/o ERM OD     Chief Complaint(s) and History of Present Illness(es)     Follow Up     Comments:  6 month follow up H/o ERM OD              Comments     Pt states vision has improved since lid surgery with Dr Palafox. Pt seeing flashes in the Left eye for the past month. Dryness in both eyes, limited relief with drops.  DM2 BS: 112 this morning.  A1C: 6.3 taken in December 2018 per pt.  No results found for: A1C    Preston CH March 26, 2019 8:25 AM

## 2019-03-26 NOTE — NURSING NOTE
Chief Complaints and History of Present Illnesses   Patient presents with     Follow Up     5 month follow up  S/p Pentrating keratoplasty (PKP) right eye (6/2015)     Chief Complaint(s) and History of Present Illness(es)     Follow Up     Associated symptoms: Negative for floaters and redness    Comments: 5 month follow up  S/p Pentrating keratoplasty (PKP) right eye (6/2015)              Comments     Pt states vision has improved since lid surgery with Dr Palafox. Pt seeing flashes in the Left eye for the past month. Dryness in both eyes, limited relief with drops.  DM2 BS: 112 this morning.  A1C: 6.3 taken in December 2018 per pt.  No results found for: A1C    Preston [facility] March 26, 2019 8:25 AM

## 2019-03-26 NOTE — PROGRESS NOTES
CC - h/o RD and ERM with CME    INTERVAL HISTORY -  VA stable in both eyes. Following with Dr. KRISSY Worthy regularly.  Lost sense of smell 2018 improving, MRI showed small stroke in Sep 2018 now improving    HPI:    64 yo male with h/o multiple surgeries OD for RD, most recently ERM peel OD 12/2016  H/o RD repair OS  H/o mulptiple cornea surgery OD & RK OS      PAST OCULAR SURGERY  YAG vitreolysis OS 6/27/17 ([facility])  PPV/MS/FAx/AC washout OD 12/22/16 for SO in AC and severe ERM  pentrating keratoplasty (PKP) right eye with intraocular lens exchange 6/17/15  Pars plana vitrectomy (PPV)/SO removal /FAx 1/25/2015 right eye   Pars plana vitrectomy (PPV)/SO placement 5/1/2014 right eye   Pars plana vitrectomy (PPV)/SO removal/FGx C3F8 2/25/14 ([facility]K) right eye   PPV/SO 9/19/13 (DDK) right eye   S/p 7/15, 8/16 Pars plana vitrectomy (PPV) and SBP with FGx (Max Alcon) OD   pentrating keratoplasty (PKP) 3/26/13 (noé)   laser in situ keratomileusis (LASIK) both eyes 1994  Radial keratotomy (RK) both eyes 1994  Cataract extraction intraocular lens both eyes 2003, 2004  Retinal detachment  Repair OS 1994    GTTS  Latanoprost 1 drop nightly both eyes  Restasis  PF BID OD      RETINAL IMAGING   OCT 3-26-19  OD- minimal edema, Epiretinal membrane, minimal distortion - stable  OS- 2-3+ CME worse    ASSESSMENT & PLAN  1.  H/o ERM OD   - s/p PPV/MS/AC washout/FAX OD 12/22/16   - IOP good, retina flat      - Continue PF BID times daily (for PKP) per Dr. Petersen   - RTC 6 months (when coming to see Dr. Petersen)     2.  H/o RD OD (multiple)   - S/p Pars plana vitrectomy (PPV)/SO removal/FAx/STS  right eye 1/25/15   - S/P prior Pars plana vitrectomy (PPV)/SO placement 5/1/14 right eye   - Retina flat. Breaks nasal, superior, and inferotemporal with pexy   - Intraocular pressure OK, retina flat    3.  Cystoid macular edema left eye   -likley chronic    -s/p YAG vitreolysis 6/27/17 by [facility]   - prior no response to gtts in 3/2018   - hesitant to  use depot steroid d/t IOP concerns   -off PF now   - unclear visual significance given prior RD & low vision   - d/w patient Tx vs observe   - will observe for now (no subjective change)      4.   s/p Retinal detachment repair left eye, 1994   -no breaks/tears, flat   -observe    5.  pentrating keratoplasty (PKP) OD     - s/p pentrating keratoplasty (PKP)/IOL exchange OD 6/2015   - follows with [facility], doing well   - saw [facility] today   - baseline PF OD 2/day    6.  ocular hypertension both eyes   - Pressures stable 13/14 today   - Continue Latanoprost  1 drop nightly OU   - Continue to monitor    7.  Pseudophakia - both eyes    In good position.    8.  DM no DR   - has had extensive laser for RD OU    RTC   6 months, OCT each eye Retina (same day cornea)    Ayaan Palmer MD, PhD  Vitreoretinal Surgery Fellow      ATTESTATION     Attending Physician Attestation:      Complete documentation of historical and exam elements from today's encounter can be found in the full encounter summary report (not reduplicated in this progress note).  I personally obtained the chief complaint(s) and history of present illness.  I confirmed and edited as necessary the review of systems, past medical/surgical history, family history, social history, and examination findings as documented by others; and I examined the patient myself.  I personally reviewed the relevant tests, images, and reports as documented above.  I personally reviewed the ophthalmic test(s) associated with this encounter, agree with the interpretation(s) as documented by the resident/fellow, and have edited the corresponding report(s) as necessary.   I formulated and edited as necessary the assessment and plan and discussed the findings and management plan with the patient and family    Yolette Perez MD, PhD  , Vitreoretinal Surgery  Department of Ophthalmology  [facility]

## 2019-04-11 ASSESSMENT — EXTERNAL EXAM - LEFT EYE: OS_EXAM: NORMAL

## 2019-04-11 ASSESSMENT — SLIT LAMP EXAM - LIDS: COMMENTS: NORMAL

## 2019-04-11 ASSESSMENT — EXTERNAL EXAM - RIGHT EYE: OD_EXAM: NORMAL

## 2019-07-08 ENCOUNTER — TELEPHONE (OUTPATIENT)
Dept: OPHTHALMOLOGY | Facility: CLINIC | Age: 64
End: 2019-07-08

## 2019-07-08 NOTE — TELEPHONE ENCOUNTER
[facility] Call Center    Phone Message    May a detailed message be left on voicemail: yes    Reason for Call: Other: Lex calling to make an appointment with Dr. Hein, whom Dr. Hale referred him to.  He states that Dr. Hein was supposed to be starting sometime after the 4th.  He also requests an appointment on the same day he sees Dr. Perez on 9/30/2019.  Please call him back to schedule once Dr. Hein's schedule template is available      Action Taken: Message routed to:  [facility] ([facility]  Eye

## 2019-07-08 NOTE — TELEPHONE ENCOUNTER
I moved Dr. Luong appointment to a Tuesday in hopes that he will be able to see Dr. Chatterjee on the same day.

## 2019-09-10 ENCOUNTER — OFFICE VISIT (OUTPATIENT)
Dept: OPHTHALMOLOGY | Facility: CLINIC | Age: 64
End: 2019-09-10
Attending: OPHTHALMOLOGY
Payer: COMMERCIAL

## 2019-09-10 DIAGNOSIS — H35.353 CYSTOID MACULAR DEGENERATION OF RETINA, BILATERAL: ICD-10-CM

## 2019-09-10 DIAGNOSIS — H35.373 EPIRETINAL MEMBRANE (ERM), BILATERAL: Primary | ICD-10-CM

## 2019-09-10 PROCEDURE — G0463 HOSPITAL OUTPT CLINIC VISIT: HCPCS | Mod: ZF

## 2019-09-10 PROCEDURE — 92134 CPTRZ OPH DX IMG PST SGM RTA: CPT | Mod: ZF | Performed by: OPHTHALMOLOGY

## 2019-09-10 ASSESSMENT — TONOMETRY
IOP_METHOD: TONOPEN
OS_IOP_MMHG: 15
OD_IOP_MMHG: 21

## 2019-09-10 ASSESSMENT — CUP TO DISC RATIO
OD_RATIO: 0.2
OS_RATIO: 0.1

## 2019-09-10 ASSESSMENT — REFRACTION_WEARINGRX
OS_SPHERE: +5.00
OD_CYLINDER: +4.50
OD_SPHERE: -2.25
SPECS_TYPE: SVL
OS_CYLINDER: +1.75
OD_AXIS: 140
OS_AXIS: 043

## 2019-09-10 ASSESSMENT — VISUAL ACUITY
CORRECTION_TYPE: GLASSES
OD_CC: 20/300
METHOD: SNELLEN - LINEAR
OS_PH_CC: 20/200
OS_CC: 20/300

## 2019-09-10 ASSESSMENT — EXTERNAL EXAM - RIGHT EYE: OD_EXAM: NORMAL

## 2019-09-10 ASSESSMENT — SLIT LAMP EXAM - LIDS: COMMENTS: NORMAL

## 2019-09-10 ASSESSMENT — CONF VISUAL FIELD
OD_SUPERIOR_TEMPORAL_RESTRICTION: 3
METHOD: COUNTING FINGERS
OD_INFERIOR_TEMPORAL_RESTRICTION: 3

## 2019-09-10 ASSESSMENT — EXTERNAL EXAM - LEFT EYE: OS_EXAM: NORMAL

## 2019-09-10 NOTE — PROGRESS NOTES
CC - h/o RD and ERM with CME    INTERVAL HISTORY -  VA stable in both eyes.  Lost sense of smell 2018 improving,   MRI showed small stroke in Sep 2018 now improving    HPI:    63 yo male with h/o multiple surgeries OD for RD, most recently ERM peel OD 12/2016  H/o RD repair OS  H/o mulptiple cornea surgery OD & RK OS      PAST OCULAR SURGERY  YAG vitreolysis OS 6/27/17 ([facility])  PPV/MS/FAx/AC washout OD 12/22/16 for SO in AC and severe ERM  pentrating keratoplasty (PKP) right eye with intraocular lens exchange 6/17/15  Pars plana vitrectomy (PPV)/SO removal /FAx 1/25/2015 right eye   Pars plana vitrectomy (PPV)/SO placement 5/1/2014 right eye   Pars plana vitrectomy (PPV)/SO removal/FGx C3F8 2/25/14 ([facility]K) right eye   PPV/SO 9/19/13 (DDK) right eye   S/p 7/15, 8/16 Pars plana vitrectomy (PPV) and SBP with FGx (Max Alcon) OD   pentrating keratoplasty (PKP) 3/26/13 (noé)   laser in situ keratomileusis (LASIK) both eyes 1994  Radial keratotomy (RK) both eyes 1994  Cataract extraction intraocular lens both eyes 2003, 2004  Retinal detachment  Repair OS 1994    GTTS  Latanoprost 1 drop nightly both eyes  Restasis  PF BID OD      RETINAL IMAGING   OCT 9-10-19  OD- minimal edema, tr residual ERM, minimal distortion - stable  OS- moderate ERM, 1-2+ CME non-centeral, better    ASSESSMENT & PLAN  1.  Tr ERM OD residual   - s/p PPV/MS/AC washout/FAX OD 12/22/16   - IOP good, retina flat   - diffuse edema no likely benefit from further Tx     - On PF BID times daily (for PKP) per Dr. Petersen   - will reduce to daily d/t IOP concerns     - RTC 6 months (will schedule with Sen biswas)     2.  H/o RD OD (multiple)   - S/p Pars plana vitrectomy (PPV)/SO removal/FAx/STS  right eye 1/25/15   - S/P prior Pars plana vitrectomy (PPV)/SO placement 5/1/14 right eye   - Retina flat. Breaks nasal, superior, and inferotemporal with pexy   - Intraocular pressure OK, retina flat    3.  Cystoid macular edema left eye   -likley chronic     -s/p YAG vitreolysis 6/27/17 by [facility]   - prior no response to gtts in 3/2018   - hesitant to use depot steroid d/t IOP concerns   - off PF now   - unclear visual significance given prior RD & low vision   - better tdoay   - observe        4.   s/p Retinal detachment repair left eye, 1994   -no breaks/tears, flat   -observe    5.  pentrating keratoplasty (PKP) OD     - s/p pentrating keratoplasty (PKP)/IOL exchange OD 6/2015   - will see Chatterjee in future   - baseline PF OD 2/day     - reduce to 1/day 9/2019 d/t IOP concerns      6.  ocular hypertension both eyes   - on xalatan   - IOP OD higher today 9/10/19   - ?steroid response     - reduce PF to 1/day     - recheck next visit   - if persistent consider referral vs add gtt        8.  DM no DR   - has had extensive laser for RD OU    RTC   6 months, OCT each eye Retina (same day cornea)        ATTESTATION     Attending Physician Attestation:      Complete documentation of historical and exam elements from today's encounter can be found in the full encounter summary report (not reduplicated in this progress note).  I personally obtained the chief complaint(s) and history of present illness.  I confirmed and edited as necessary the review of systems, past medical/surgical history, family history, social history, and examination findings as documented by others; and I examined the patient myself.  I personally reviewed the relevant tests, images, and reports as documented above.  I formulated and edited as necessary the assessment and plan and discussed the findings and management plan with the patient and family    Yolette Perez MD, PhD  , Vitreoretinal Surgery  Department of Ophthalmology  [facility]

## 2019-09-10 NOTE — Clinical Note
This former patient of Page will see you in 6 months, s/p PKP 2016.   I'm reducing his PF from BID to daily d/t IOP concerns, let me know if you have any objections

## 2019-09-17 ASSESSMENT — CONF VISUAL FIELD: OS_NORMAL: 1

## 2019-09-17 NOTE — NURSING NOTE
Chief Complaints and History of Present Illnesses   Patient presents with     Follow Up     Chief Complaint(s) and History of Present Illness(es)     Follow Up     Laterality: both eyes    Treatments tried: eye drops    Pain scale: 0/10              Comments     h/o RD and ERM with CME  Pt states that the only thing that relieves his dry eyes is Vaseline, he uses as needed.     Chandler Ren8:40 AM September 10, 2019   Exam/info reviewed and verified  NOA Wilkerson 9:08 AM September 10, 2019

## 2019-11-05 ENCOUNTER — HEALTH MAINTENANCE LETTER (OUTPATIENT)
Age: 64
End: 2019-11-05

## 2020-02-16 ENCOUNTER — HEALTH MAINTENANCE LETTER (OUTPATIENT)
Age: 65
End: 2020-02-16

## 2020-02-18 ENCOUNTER — TELEPHONE (OUTPATIENT)
Dept: OPHTHALMOLOGY | Facility: CLINIC | Age: 65
End: 2020-02-18

## 2020-02-18 NOTE — TELEPHONE ENCOUNTER
Spoke to Suhas Perrin (pt's regular eye doctor)    No signs of cornea rejection or acute cornea/retina problem    Pt having scratchiness/drynes and working with Dr. Stapleton with bandage contact lenses    Pt may continue f/u with Dr. Stapleton and Dr. Stapleton able to see pt this week if pt having scratchiness-- Dr. Stapleton comfortable working with scleral lenses and has discussed that option with pt    Pt aware and comfortable with plan    Nils Warner RN 1:05 PM 02/19/20        Spoke to pt at 1451  Pt sees eye doctor in [place] and referring back down for cornea inflammation/retina swelling and severe dryness    Pt currently scheduled with cornea and retina march 28th    Pt to f/u much sooner per patient/referring provider    Will review scheduling with facilitators and call back    Nils Warner RN 2:52 PM 02/18/20      [facility] Call Center    Phone Message    May a detailed message be left on voicemail: yes     Reason for Call: Symptoms or Concerns     If patient has red-flag symptoms, warm transfer to triage line    Current symptom or concern: The pt is having severe dry eye and inflammation in his cornea. He saw his local MD, that MD sent the information to our clinic, and the pt states his wife got a text to come in for a visit. So he is calling to schedule. I didn't have any appts for either Chris or Sen in the near future. Please call the pt's cell # [phone]. Thanks.      Symptoms have been present for:  couple day(s)    Has patient previously been seen for this? Yes    By : Chris and [facility]    Date: 9.10.2019    Are there any new or worsening symptoms? Yes: see above      Action Taken: Message routed to:  [facility]  eye gen    Travel Screening: Not Applicable

## 2020-03-02 DIAGNOSIS — Z94.7 CORNEA REPLACED BY TRANSPLANT: ICD-10-CM

## 2020-03-02 NOTE — TELEPHONE ENCOUNTER
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

## 2020-03-03 RX ORDER — PREDNISOLONE ACETATE 10 MG/ML
1 SUSPENSION/ DROPS OPHTHALMIC 2 TIMES DAILY
Qty: 5 ML | Refills: 0 | Status: SHIPPED | OUTPATIENT
Start: 2020-03-03

## 2020-03-03 NOTE — TELEPHONE ENCOUNTER
Will order 1 bottle. Patient has appointment with Dr. Chatterjee on 3/10/20 and if he needs more, we can adjust the drops at that point    Gene Sanchez MD  Ophthalmology Resident, PGY-2

## 2020-03-18 ENCOUNTER — TELEPHONE (OUTPATIENT)
Dept: OPHTHALMOLOGY | Facility: CLINIC | Age: 65
End: 2020-03-18

## 2020-03-18 DIAGNOSIS — H35.373 EPIRETINAL MEMBRANE (ERM), BILATERAL: Primary | ICD-10-CM

## 2020-03-18 NOTE — TELEPHONE ENCOUNTER
COVID-19 RESCHEDULES    Date contacted: March 18, 2020 12:42 PM    Type of contact: Spoke with patient or left message    Current appointment date: 3/24/20    Attending provider: Sen    Current Eye Symptoms: None at present    Refills needed: None    Best contact for rescheduling: Self    Best date/time for rescheduling: any    Sharon LATHAM 12:42 PM March 18, 2020

## 2020-04-29 ENCOUNTER — TELEPHONE (OUTPATIENT)
Dept: OPHTHALMOLOGY | Facility: CLINIC | Age: 65
End: 2020-04-29

## 2020-04-29 NOTE — TELEPHONE ENCOUNTER
COVID-19 RESCHEDULES     Needed: NO    Date contacted: April 29, 2020 12:42 PM    Type of contact: left message on wife mobile number    Current appointment date: 5/5/2020    Attending provider(s) or other suggested: JOHN HARDEN    Current Eye Symptoms: NA    Refills needed: NA    Best contact for rescheduling: ON FILE/ MAIL    Best date/time for rescheduling or with another provider or clinic: DEFER TO July/AUGUST; DERECK NOT SEEING PTS AT THIS TIME ON TUESDAYS OR FRIDAYS; EMERGENT ONLY ON THURSDAYS.    COVID19 warnings given about screening and visitors: NO    Mailed letter for NONE contact w/ Patient: YES    Mariam Mcneil COA COA 12:42 PM April 29, 2020

## 2020-05-04 ENCOUNTER — TELEPHONE (OUTPATIENT)
Dept: OPHTHALMOLOGY | Facility: CLINIC | Age: 65
End: 2020-05-04

## 2020-05-04 NOTE — TELEPHONE ENCOUNTER
I spoke to the patient who is noting dryness with his right eye.  He notes that his vision is very poor and he can only see a few feet in front of him.  He currently is using EES ointment (4 times daily) , Prednisolone twice daily and Latanoprost.  The Restasis didn't work for him.  Message sent to the Resident and Cornea team for further consideration

## 2020-05-04 NOTE — TELEPHONE ENCOUNTER
[facility] Call Center    Phone Message    May a detailed message be left on voicemail: yes     Reason for Call: Other: Pt was canceled and needed to be reschediled July/ Aug He has dry eyes and would like to discuss with care Team , Please call to discuss  Thank you,    Action Taken: Message routed to:  [facility] eye    Travel Screening: Not Applicable

## 2020-05-05 NOTE — TELEPHONE ENCOUNTER
Dr. Bautista spoke to pt   Recommended evaluation may 14th with Dr. Chatterjee  Scheduled by facilitator    Pt aware of date/time/location at [facility] and COVID visitor policy was reviewed previously/pt aware  Nils Warner RN 12:20 PM 05/05/20

## 2020-05-05 NOTE — PROGRESS NOTES
SWP today. Pt with progressive blurring of vision in OD since January. Also with worsening pain in OD since January. He has increased EES pauly to QID and this has been somewhat helpful, although he is having great difficulty functioning. He was given bandage CTLs by local optometrist which have helped somewhat but not resolved symptoms.     Advised patient that I would see if we could schedule patient with Dr. Chatterjee on 5/14/20. Sandrita li will call back sooner if he has further problems.     Sj Bautista

## 2020-05-14 ENCOUNTER — OFFICE VISIT (OUTPATIENT)
Dept: OPHTHALMOLOGY | Facility: CLINIC | Age: 65
End: 2020-05-14
Attending: OPHTHALMOLOGY
Payer: COMMERCIAL

## 2020-05-14 DIAGNOSIS — Z96.1 PSEUDOPHAKIA OF BOTH EYES: ICD-10-CM

## 2020-05-14 DIAGNOSIS — Z94.7 CORNEA REPLACED BY TRANSPLANT: ICD-10-CM

## 2020-05-14 DIAGNOSIS — H04.121 DRY EYE OF RIGHT SIDE: Primary | ICD-10-CM

## 2020-05-14 PROCEDURE — G0463 HOSPITAL OUTPT CLINIC VISIT: HCPCS | Mod: ZF

## 2020-05-14 PROCEDURE — 68760 CLOSE TEAR DUCT OPENING: CPT | Mod: RT | Performed by: OPHTHALMOLOGY

## 2020-05-14 RX ORDER — OXYBUTYNIN CHLORIDE 5 MG/1
TABLET, EXTENDED RELEASE ORAL
COMMUNITY
Start: 2020-03-17

## 2020-05-14 RX ORDER — LANOLIN ALCOHOL/MO/W.PET/CERES
1000 CREAM (GRAM) TOPICAL
COMMUNITY
Start: 2020-05-12

## 2020-05-14 RX ORDER — TAMSULOSIN HYDROCHLORIDE 0.4 MG/1
CAPSULE ORAL
COMMUNITY
Start: 2019-09-04

## 2020-05-14 RX ORDER — NAPROXEN SODIUM 220 MG
220 TABLET ORAL
COMMUNITY

## 2020-05-14 RX ORDER — ERYTHROMYCIN 5 MG/G
OINTMENT OPHTHALMIC
COMMUNITY
Start: 2020-05-12

## 2020-05-14 RX ORDER — LEVETIRACETAM 750 MG/1
TABLET ORAL
COMMUNITY
Start: 2020-04-16

## 2020-05-14 ASSESSMENT — CONF VISUAL FIELD
OS_SUPERIOR_NASAL_RESTRICTION: 3
METHOD: COUNTING FINGERS
OD_SUPERIOR_TEMPORAL_RESTRICTION: 3
OD_SUPERIOR_NASAL_RESTRICTION: 3
OS_SUPERIOR_TEMPORAL_RESTRICTION: 3

## 2020-05-14 ASSESSMENT — VISUAL ACUITY
OS_CC+: +1
OS_CC: 20/300
CORRECTION_TYPE: GLASSES, CONTACTS
OS_PH_CC: 20/250
METHOD: SNELLEN - LINEAR
OD_CC: 20/400

## 2020-05-14 ASSESSMENT — TONOMETRY
OS_IOP_MMHG: 23
OD_IOP_MMHG: 13
IOP_METHOD: ICARE

## 2020-05-14 ASSESSMENT — EXTERNAL EXAM - LEFT EYE: OS_EXAM: NORMAL

## 2020-05-14 ASSESSMENT — CUP TO DISC RATIO
OS_RATIO: 0.1
OD_RATIO: 0.2

## 2020-05-14 ASSESSMENT — SLIT LAMP EXAM - LIDS: COMMENTS: NORMAL

## 2020-05-14 ASSESSMENT — EXTERNAL EXAM - RIGHT EYE: OD_EXAM: NORMAL

## 2020-05-14 NOTE — PROGRESS NOTES
CC - recheck for K transplant     INTERVAL HISTORY -  Doing well since last visit. Saw Retina and plastics last week. CME improved - stopped ketorolac. Noted to have ptosis left eye- planning for surgical repair left eye with Dr. Rodarte in 2 weeks.    Vision stable. No eye pain or redness.      HPI:    60 yo male s/p PPV/MP/AC washout/FAX OD for ERM 12/22/16   Mr. Oates is a 61  year old male s/p silicone oil removal right eye 1/25/2015, prior  PPV/SO replacement for RRD (5/1/14).  Retinal detachment right eye s/p repair x 4, most recent Pars plana vitrectomy (PPV)/SO 5/1/14     Ocular surgery:  PPV/MS/FAx/AC washout OD 12/22/16 for SO in AC and severe ERM  pentrating keratoplasty (PKP) right eye with intraocular lens exchange 6/17/15  Pars plana vitrectomy (PPV)/SO removal /FAx 1/25/2015 right eye   Pars plana vitrectomy (PPV)/SO placement 5/1/2014 right eye   Pars plana vitrectomy (PPV)/SO removal/FGx C3F8 2/25/14 ([facility]) right eye   PPV/SO 9/19/13 (DDK) right eye   S/p 7/15, 8/16 Pars plana vitrectomy (PPV) and SBP with FGx (Max Alcon) OD  S/p pentrating keratoplasty (PKP) 3/26/13 (noé)  S/p laser in situ keratomileusis (LASIK) both eyes 1994  S/p Radial keratotomy (RK) both eyes 1994  S/p Cataract extraction intraocular lens both eyes 2003, 2004  Sp Retinal detachment  Repair left eye 1994     Gtts:  Latanoprost at bedtime both eyes  Pred BID OD  Restasis once daily right eye   Systane nighttime at bedtime both eyes        ASSESSMENT & PLAN  1A> Severe right eye dry eye.  - Systane Ultra right eye 1-2 hours  -RLL Thermal Cautery  -Disc humidifier  -No ceiling fans  -Disc other tx  -pt failed previous restasis trial.      1B. S/p Pentrating keratoplasty (PKP) right eye (6/2015)                        - s/p penetrating keratoplasty (PKP)/IOL exchange 6/2015                        -no loose sutures today                            -significant PEE right eye - increase restasis to BID right eye,  aggressive lubrication, can use ointment at bedtime     - plugs didn't help in the past    - continue pred BID right eye      2.  H/o RD OD (multiple)                        - S/p Pars plana vitrectomy (PPV)/SO removal/FAx/STS  right eye 1/25/15                        - follows with Dr. MARKS    - doing well last week    - reviewed RT/RD precautions     3.  Cystoid macular edema left eye                        - likeley chronic                         -follows with Dr. MARKS, continue drops per retina- stopped last visit     - monitor     5.   s/p Retinal detachment repair left eye, 1994                        -no breaks/tears    - reviewed RT/RD precautions                        -observe     6.  Ocular hypertension both eyes                        - IOP wnl right eye, slightly higher left eye today (26 vs. 21 last visit)    - continue latanprost at bedtime OU     7.  Pseudophakia - both eyes     -s/p IOL exchange with glued IOL 6/2015    - stable each eye    - monitor       RTC: 1 month    Attending Physician Attestation:  Complete documentation of historical and exam elements from today's encounter can be found in the full encounter summary report (not reduplicated in this progress note).  I personally obtained the chief complaint(s) and history of present illness.  I confirmed and edited as necessary the review of systems, past medical/surgical history, family history, social history, and examination findings as documented by others; and I examined the patient myself.  I personally reviewed the relevant tests, images, and reports as documented above.  I formulated and edited as necessary the assessment and plan and discussed the findings and management plan with the patient and family. - Gino Chatterjee MD     I was present for the entire procedure(s). - Gino Chatterjee MD

## 2020-05-14 NOTE — NURSING NOTE
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  In Early February he started putting a bandage contact on his right eye to reduce the pain.  He can wear the contact for about 2 days before has to replace it.  He states that his pain is at a 15 when he does not have the contact lens on his eye.    He has been using Prednisolone acetate twice a day, Latanoprost in both eyes at night, Refresh artificial tears as needed and Erythromycin ointment as needed.      LIANNE Hernandez 10:55 AM  May 14, 2020

## 2020-07-20 NOTE — PROGRESS NOTES
CC - recheck for K transplant     INTERVAL HISTORY -  Patient reports that his eyes are doing well. Vision has been stable. No pain, redness, discharge from either eye.       Vision stable. No eye pain or redness.      HPI:    60 yo male s/p PPV/MP/AC washout/FAX OD for ERM 12/22/16   Mr. Oates is a 61  year old male s/p silicone oil removal right eye 1/25/2015, prior  PPV/SO replacement for RRD (5/1/14).  Retinal detachment right eye s/p repair x 4, most recent Pars plana vitrectomy (PPV)/SO 5/1/14     Ocular surgery:  PPV/MS/FAx/AC washout OD 12/22/16 for SO in AC and severe ERM  pentrating keratoplasty (PKP) right eye with intraocular lens exchange 6/17/15  Pars plana vitrectomy (PPV)/SO removal /FAx 1/25/2015 right eye   Pars plana vitrectomy (PPV)/SO placement 5/1/2014 right eye   Pars plana vitrectomy (PPV)/SO removal/FGx C3F8 2/25/14 (DDK) right eye   PPV/SO 9/19/13 (DDK) right eye   S/p 7/15, 8/16 Pars plana vitrectomy (PPV) and SBP with FGx (Max Alcon) OD  S/p pentrating keratoplasty (PKP) 3/26/13 (noé)  S/p laser in situ keratomileusis (LASIK) both eyes 1994  S/p Radial keratotomy (RK) both eyes 1994  S/p Cataract extraction intraocular lens both eyes 2003, 2004  Sp Retinal detachment  Repair left eye 1994     Gtts:  Latanoprost at bedtime both eyes  Pred BID OD  Systane nighttime at bedtime both eyes  Systane Ultra right eye q1-2h         ASSESSMENT & PLAN  1A> Severe right eye dry eye--improved - stable with current tx.  - Continue Systane Ultra right eye 1-2 hours  - RLL Thermal Cautery on 5/14/2020   - Disc humidifier  - No ceiling fans  - Disc other tx  -pt failed previous restasis trial.  -Disc Dry eye glasses with gasket.      1B. S/p Pentrating keratoplasty (PKP) right eye (6/2015)                        - s/p penetrating keratoplasty (PKP)/IOL exchange 6/2015                        -no loose sutures today                             aggressive lubrication, can use ointment at bedtime      - plugs didn't help in the past    - continue pred BID right eye      2.  H/o RD OD (multiple)                        - S/p Pars plana vitrectomy (PPV)/SO removal/FAx/STS  right eye 1/25/15                        - follows with Dr. MARKS    - doing well last week    - reviewed RT/RD precautions     3.  Cystoid macular edema left eye                        - likely chronic                         -follows with Dr. MARKS, continue drops per retina- stopped last visit     - monitor     5.   s/p Retinal detachment repair left eye, 1994                        -no breaks/tears    - reviewed RT/RD precautions                        -observe     6.  Ocular hypertension both eyes                        - IOP wnl right eye, slightly higher left eye today (26 vs. 21 last visit)    - continue latanprost at bedtime OU     7.  Pseudophakia - both eyes     -s/p IOL exchange with glued IOL 6/2015    - stable each eye    - monitor       RTC: 6 month    Bryan Dominguez MD  Ophthalmology PGY-3    Attending Physician Attestation:  Complete documentation of historical and exam elements from today's encounter can be found in the full encounter summary report (not reduplicated in this progress note).  I personally obtained the chief complaint(s) and history of present illness.  I confirmed and edited as necessary the review of systems, past medical/surgical history, family history, social history, and examination findings as documented by others; and I examined the patient myself.  I personally reviewed the relevant tests, images, and reports as documented above.  I formulated and edited as necessary the assessment and plan and discussed the findings and management plan with the patient and family. - Gino Chatterjee MD

## 2020-07-21 ENCOUNTER — OFFICE VISIT (OUTPATIENT)
Dept: OPHTHALMOLOGY | Facility: CLINIC | Age: 65
End: 2020-07-21
Attending: OPHTHALMOLOGY
Payer: COMMERCIAL

## 2020-07-21 DIAGNOSIS — Z94.7 CORNEA REPLACED BY TRANSPLANT: Primary | ICD-10-CM

## 2020-07-21 DIAGNOSIS — H35.353 CYSTOID MACULAR DEGENERATION OF RETINA, BILATERAL: ICD-10-CM

## 2020-07-21 DIAGNOSIS — H04.121 DRY EYE OF RIGHT SIDE: ICD-10-CM

## 2020-07-21 DIAGNOSIS — Z96.1 PSEUDOPHAKIA OF BOTH EYES: ICD-10-CM

## 2020-07-21 PROCEDURE — G0463 HOSPITAL OUTPT CLINIC VISIT: HCPCS | Mod: ZF

## 2020-07-21 ASSESSMENT — PACHYMETRY
OS_CT(UM): 609
OD_CT(UM): 534

## 2020-07-21 ASSESSMENT — VISUAL ACUITY
OS_CC: 20/400
CORRECTION_TYPE: GLASSES
OS_PH_CC: 20/250
OD_CC: 20/400
METHOD: SNELLEN - LINEAR

## 2020-07-21 ASSESSMENT — REFRACTION_WEARINGRX
OS_AXIS: 043
OD_CYLINDER: +4.50
SPECS_TYPE: SVL
OS_SPHERE: +5.00
OD_AXIS: 140
OD_SPHERE: -2.25
OS_CYLINDER: +1.75

## 2020-07-21 ASSESSMENT — TONOMETRY
OD_IOP_MMHG: 10
IOP_METHOD: ICARE
OS_IOP_MMHG: 13

## 2020-07-21 ASSESSMENT — EXTERNAL EXAM - RIGHT EYE: OD_EXAM: NORMAL

## 2020-07-21 ASSESSMENT — SLIT LAMP EXAM - LIDS: COMMENTS: NORMAL

## 2020-07-21 ASSESSMENT — EXTERNAL EXAM - LEFT EYE: OS_EXAM: NORMAL

## 2020-07-21 NOTE — NURSING NOTE
Chief Complaints and History of Present Illnesses   Patient presents with     Dry Eye(s) Follow-Up     2 month follow up bilateral dry eye. S/p LE cautery      Chief Complaint(s) and History of Present Illness(es)     Dry Eye(s) Follow-Up     Laterality: right eye    Associated signs and symptoms: blurred vision.  Negative for irritation, eye pain, discharge and tearing    Course: stable    Treatments tried: artificial tears    Comments: 2 month follow up bilateral dry eye. S/p LE cautery               Comments     Pt denies any significant vision changes in either eye since last visit.  Denies any pain, pressure, irritation, discharge, and tearing.  Ocular Meds: Systane ultra q2h PRN, Prednisolone BID RE, Latanoprost at bedtime BE, Erythromycin pauly at bedtime RE    Anabel Ulloa OT 10:05 AM July 21, 2020

## 2020-11-22 ENCOUNTER — HEALTH MAINTENANCE LETTER (OUTPATIENT)
Age: 65
End: 2020-11-22

## 2021-01-01 ENCOUNTER — HEALTH MAINTENANCE LETTER (OUTPATIENT)
Age: 66
End: 2021-01-01

## 2021-04-04 ENCOUNTER — HEALTH MAINTENANCE LETTER (OUTPATIENT)
Age: 66
End: 2021-04-04

## 2022-01-01 ENCOUNTER — HEALTH MAINTENANCE LETTER (OUTPATIENT)
Age: 67
End: 2022-01-01

## (undated) DEVICE — SOL NACL 0.9% IRRIG 1000ML BOTTLE 07138-09

## (undated) DEVICE — GLOVE PROTEXIS W/NEU-THERA 7.5  2D73TE75

## (undated) DEVICE — SOL WATER IRRIG 1000ML BOTTLE 2F7114

## (undated) DEVICE — SU PLAIN 6-0 G-1DA 18" 770G

## (undated) DEVICE — LINEN TOWEL PACK X5 5464

## (undated) DEVICE — SU SILK 6-0 C-1 18" 707G

## (undated) DEVICE — GLOVE PROTEXIS MICRO 6.0  2D73PM60

## (undated) DEVICE — ESU EYE HIGH TEMP 65410-183

## (undated) DEVICE — PACK OCULOPLATIC SEN15OCFSD

## (undated) DEVICE — EYE PREP BETADINE 5% SOLUTION 30ML 0065-0411-30

## (undated) DEVICE — SU SILK 4-0 J-1 18" 734G

## (undated) RX ORDER — LIDOCAINE HYDROCHLORIDE 20 MG/ML
INJECTION, SOLUTION EPIDURAL; INFILTRATION; INTRACAUDAL; PERINEURAL
Status: DISPENSED
Start: 2018-10-19

## (undated) RX ORDER — FENTANYL CITRATE 50 UG/ML
INJECTION, SOLUTION INTRAMUSCULAR; INTRAVENOUS
Status: DISPENSED
Start: 2018-10-19